# Patient Record
Sex: MALE | Race: WHITE | NOT HISPANIC OR LATINO | Employment: FULL TIME | URBAN - METROPOLITAN AREA
[De-identification: names, ages, dates, MRNs, and addresses within clinical notes are randomized per-mention and may not be internally consistent; named-entity substitution may affect disease eponyms.]

---

## 2020-12-09 ENCOUNTER — LAB VISIT (OUTPATIENT)
Dept: LAB | Facility: HOSPITAL | Age: 50
End: 2020-12-09
Attending: FAMILY MEDICINE
Payer: COMMERCIAL

## 2020-12-09 ENCOUNTER — OFFICE VISIT (OUTPATIENT)
Dept: INTERNAL MEDICINE | Facility: CLINIC | Age: 50
End: 2020-12-09
Payer: COMMERCIAL

## 2020-12-09 VITALS
DIASTOLIC BLOOD PRESSURE: 86 MMHG | OXYGEN SATURATION: 95 % | HEIGHT: 70 IN | HEART RATE: 78 BPM | SYSTOLIC BLOOD PRESSURE: 122 MMHG | WEIGHT: 268.75 LBS | BODY MASS INDEX: 38.47 KG/M2 | TEMPERATURE: 97 F

## 2020-12-09 DIAGNOSIS — Z00.00 ANNUAL PHYSICAL EXAM: ICD-10-CM

## 2020-12-09 DIAGNOSIS — Z82.41 FAMILY HISTORY OF SUDDEN CARDIAC DEATH: ICD-10-CM

## 2020-12-09 DIAGNOSIS — Z12.5 SCREENING FOR PROSTATE CANCER: ICD-10-CM

## 2020-12-09 DIAGNOSIS — Z11.59 NEED FOR HEPATITIS C SCREENING TEST: ICD-10-CM

## 2020-12-09 DIAGNOSIS — Z11.4 SCREENING FOR HIV (HUMAN IMMUNODEFICIENCY VIRUS): ICD-10-CM

## 2020-12-09 DIAGNOSIS — Z00.00 ANNUAL PHYSICAL EXAM: Primary | ICD-10-CM

## 2020-12-09 DIAGNOSIS — E11.69 HYPERLIPIDEMIA ASSOCIATED WITH TYPE 2 DIABETES MELLITUS: ICD-10-CM

## 2020-12-09 DIAGNOSIS — Z12.11 SCREENING FOR COLON CANCER: ICD-10-CM

## 2020-12-09 DIAGNOSIS — M1A.09X0 IDIOPATHIC CHRONIC GOUT OF MULTIPLE SITES WITHOUT TOPHUS: ICD-10-CM

## 2020-12-09 DIAGNOSIS — E78.5 HYPERLIPIDEMIA ASSOCIATED WITH TYPE 2 DIABETES MELLITUS: ICD-10-CM

## 2020-12-09 DIAGNOSIS — E66.01 SEVERE OBESITY (BMI 35.0-39.9) WITH COMORBIDITY: ICD-10-CM

## 2020-12-09 DIAGNOSIS — E11.9 TYPE 2 DIABETES MELLITUS WITHOUT COMPLICATION, WITHOUT LONG-TERM CURRENT USE OF INSULIN: ICD-10-CM

## 2020-12-09 DIAGNOSIS — B00.1 RECURRENT COLD SORES: ICD-10-CM

## 2020-12-09 LAB
ALBUMIN SERPL BCP-MCNC: 4.3 G/DL (ref 3.5–5.2)
ALP SERPL-CCNC: 65 U/L (ref 55–135)
ALT SERPL W/O P-5'-P-CCNC: 28 U/L (ref 10–44)
ANION GAP SERPL CALC-SCNC: 11 MMOL/L (ref 8–16)
AST SERPL-CCNC: 26 U/L (ref 10–40)
BILIRUB SERPL-MCNC: 0.8 MG/DL (ref 0.1–1)
BUN SERPL-MCNC: 16 MG/DL (ref 6–20)
CALCIUM SERPL-MCNC: 9.6 MG/DL (ref 8.7–10.5)
CHLORIDE SERPL-SCNC: 101 MMOL/L (ref 95–110)
CHOLEST SERPL-MCNC: 149 MG/DL (ref 120–199)
CHOLEST/HDLC SERPL: 4.5 {RATIO} (ref 2–5)
CO2 SERPL-SCNC: 25 MMOL/L (ref 23–29)
COMPLEXED PSA SERPL-MCNC: 0.78 NG/ML (ref 0–4)
CREAT SERPL-MCNC: 1.1 MG/DL (ref 0.5–1.4)
ERYTHROCYTE [DISTWIDTH] IN BLOOD BY AUTOMATED COUNT: 12.3 % (ref 11.5–14.5)
EST. GFR  (AFRICAN AMERICAN): >60 ML/MIN/1.73 M^2
EST. GFR  (NON AFRICAN AMERICAN): >60 ML/MIN/1.73 M^2
ESTIMATED AVG GLUCOSE: 169 MG/DL (ref 68–131)
GLUCOSE SERPL-MCNC: 151 MG/DL (ref 70–110)
HBA1C MFR BLD HPLC: 7.5 % (ref 4–5.6)
HCT VFR BLD AUTO: 46.6 % (ref 40–54)
HDLC SERPL-MCNC: 33 MG/DL (ref 40–75)
HDLC SERPL: 22.1 % (ref 20–50)
HGB BLD-MCNC: 15.6 G/DL (ref 14–18)
LDLC SERPL CALC-MCNC: 43 MG/DL (ref 63–159)
MCH RBC QN AUTO: 30.2 PG (ref 27–31)
MCHC RBC AUTO-ENTMCNC: 33.5 G/DL (ref 32–36)
MCV RBC AUTO: 90 FL (ref 82–98)
NONHDLC SERPL-MCNC: 116 MG/DL
PLATELET # BLD AUTO: 217 K/UL (ref 150–350)
PMV BLD AUTO: 11 FL (ref 9.2–12.9)
POTASSIUM SERPL-SCNC: 4.4 MMOL/L (ref 3.5–5.1)
PROT SERPL-MCNC: 7.8 G/DL (ref 6–8.4)
RBC # BLD AUTO: 5.17 M/UL (ref 4.6–6.2)
SODIUM SERPL-SCNC: 137 MMOL/L (ref 136–145)
TRIGL SERPL-MCNC: 365 MG/DL (ref 30–150)
TSH SERPL DL<=0.005 MIU/L-ACNC: 2.21 UIU/ML (ref 0.4–4)
URATE SERPL-MCNC: 7.5 MG/DL (ref 3.4–7)
WBC # BLD AUTO: 8.45 K/UL (ref 3.9–12.7)

## 2020-12-09 PROCEDURE — 86803 HEPATITIS C AB TEST: CPT

## 2020-12-09 PROCEDURE — 84550 ASSAY OF BLOOD/URIC ACID: CPT

## 2020-12-09 PROCEDURE — 99999 PR PBB SHADOW E&M-NEW PATIENT-LVL III: ICD-10-PCS | Mod: PBBFAC,,, | Performed by: FAMILY MEDICINE

## 2020-12-09 PROCEDURE — 80061 LIPID PANEL: CPT

## 2020-12-09 PROCEDURE — 83036 HEMOGLOBIN GLYCOSYLATED A1C: CPT

## 2020-12-09 PROCEDURE — 36415 COLL VENOUS BLD VENIPUNCTURE: CPT

## 2020-12-09 PROCEDURE — 86703 HIV-1/HIV-2 1 RESULT ANTBDY: CPT

## 2020-12-09 PROCEDURE — 99999 PR PBB SHADOW E&M-NEW PATIENT-LVL III: CPT | Mod: PBBFAC,,, | Performed by: FAMILY MEDICINE

## 2020-12-09 PROCEDURE — 80053 COMPREHEN METABOLIC PANEL: CPT

## 2020-12-09 PROCEDURE — 99386 PR PREVENTIVE VISIT,NEW,40-64: ICD-10-PCS | Mod: S$GLB,,, | Performed by: FAMILY MEDICINE

## 2020-12-09 PROCEDURE — 84443 ASSAY THYROID STIM HORMONE: CPT

## 2020-12-09 PROCEDURE — 85027 COMPLETE CBC AUTOMATED: CPT

## 2020-12-09 PROCEDURE — 84153 ASSAY OF PSA TOTAL: CPT

## 2020-12-09 PROCEDURE — 99386 PREV VISIT NEW AGE 40-64: CPT | Mod: S$GLB,,, | Performed by: FAMILY MEDICINE

## 2020-12-09 RX ORDER — VALACYCLOVIR HYDROCHLORIDE 1 G/1
1000 TABLET, FILM COATED ORAL DAILY
COMMUNITY
End: 2021-12-28 | Stop reason: SDUPTHER

## 2020-12-09 RX ORDER — VALACYCLOVIR HYDROCHLORIDE 500 MG/1
500 TABLET, FILM COATED ORAL
COMMUNITY
End: 2020-12-09

## 2020-12-09 RX ORDER — GLIMEPIRIDE 2 MG/1
2 TABLET ORAL
COMMUNITY
End: 2021-12-07 | Stop reason: SDUPTHER

## 2020-12-09 RX ORDER — COLCHICINE 0.6 MG/1
0.6 TABLET ORAL
COMMUNITY
End: 2021-10-14

## 2020-12-09 RX ORDER — METFORMIN HYDROCHLORIDE 500 MG/1
500 TABLET, EXTENDED RELEASE ORAL 2 TIMES DAILY WITH MEALS
COMMUNITY
End: 2021-12-07 | Stop reason: SDUPTHER

## 2020-12-09 RX ORDER — ALLOPURINOL 300 MG/1
300 TABLET ORAL DAILY
COMMUNITY
End: 2021-10-14 | Stop reason: SDUPTHER

## 2020-12-09 RX ORDER — TADALAFIL 20 MG/1
10-20 TABLET ORAL DAILY PRN
COMMUNITY
Start: 2020-09-15 | End: 2021-04-19 | Stop reason: SDUPTHER

## 2020-12-09 RX ORDER — LOVASTATIN 40 MG/1
40 TABLET ORAL DAILY
COMMUNITY
End: 2021-04-19 | Stop reason: SDUPTHER

## 2020-12-09 RX ORDER — ASPIRIN 81 MG/1
81 TABLET ORAL DAILY
Refills: 0
Start: 2020-12-09 | End: 2022-05-10

## 2020-12-09 NOTE — PROGRESS NOTES
Subjective:   Patient ID:  Andrade Barahona is a 50 y.o. male.    Chief Complaint:  Annual Exam and Establish Care    Past Medical History:   Diagnosis Date    Diabetes mellitus, type 2     Hyperlipidemia associated with type 2 diabetes mellitus 12/9/2020    Idiopathic chronic gout of multiple sites without tophus 12/9/2020    Recurrent cold sores 12/9/2020    Severe obesity (BMI 35.0-39.9) with comorbidity 12/9/2020     Past Surgical History:   Procedure Laterality Date    APPENDECTOMY      KNEE SURGERY       Family History   Problem Relation Age of Onset    Heart disease Father      Review of patient's allergies indicates:  No Known Allergies    Current Outpatient Medications:     allopurinoL (ZYLOPRIM) 300 MG tablet, Take 300 mg by mouth once daily., Disp: , Rfl:     colchicine (COLCRYS) 0.6 mg tablet, Take 0.6 mg by mouth as needed., Disp: , Rfl:     glimepiride (AMARYL) 2 MG tablet, Take 2 mg by mouth before breakfast., Disp: , Rfl:     lovastatin (MEVACOR) 40 MG tablet, Take 40 mg by mouth once daily., Disp: , Rfl:     metFORMIN (GLUCOPHAGE-XR) 500 MG ER 24hr tablet, Take 500 mg by mouth 2 (two) times daily with meals., Disp: , Rfl:     tadalafiL (CIALIS) 20 MG Tab, Take 10-20 mg by mouth daily as needed., Disp: , Rfl:     valACYclovir (VALTREX) 1000 MG tablet, Take 1,000 mg by mouth once daily., Disp: , Rfl:     aspirin (ECOTRIN) 81 MG EC tablet, Take 1 tablet (81 mg total) by mouth once daily., Disp: , Rfl: 0     Patient presents annual physical exam/ establish care  Last physical March 2019 by PCP in [place].      Medical history for:   - Diabetes mellitus.  Reports last A1c less than 8%.  Metformin  twice a day and   Amaryl 2 mg daily.  Reports compliance.  Denies side effects.  No symptoms hypo or hyperglycemia. Due for A1c, micro albumin, eye exam, and foot exam  - Hyperlipidemia.  On Mevacor 40 mg daily.  Reports compliance.  Denies side effects.  No chest pain or claudication.   No recent lipid panel on file.    - Gout.  On allopurinol 300 mg daily.  No exacerbations /ataxia greater than 2 years on medication.  Uses Colcrys if needed.    - Fever blister/cold sores.  Valtrex  500 mg twice a day as needed for outbreak.  - Erectile dysfunction.  Cialis as needed.    Family history for:   Sudden cardiac death in 2 family members father and grandfather.  Unclear etiology.  Questionable heart valve/ structural aided versus coronary artery disease.  Both were asymptomatic.    No colon or prostate cancer    Health maintenance needs include:  Colon cancer screening  Tetanus, shingles, pneumonia, and flu vaccines  HIV and hep C screening    No specific complaints or concerns today.          Review of Systems   Constitutional: Negative for chills, diaphoresis, fatigue and fever.   HENT: Negative for congestion, dental problem, ear discharge, ear pain, postnasal drip, rhinorrhea, sinus pressure, sinus pain, sore throat and trouble swallowing.    Eyes: Negative for pain, redness and visual disturbance.   Respiratory: Negative for cough, chest tightness, shortness of breath and wheezing.    Cardiovascular: Negative for chest pain, palpitations and leg swelling.   Gastrointestinal: Negative for abdominal pain, constipation, diarrhea, nausea and vomiting.   Endocrine: Negative for cold intolerance, heat intolerance, polydipsia, polyphagia and polyuria.   Genitourinary: Negative for difficulty urinating, dysuria, flank pain, frequency, hematuria and urgency.   Musculoskeletal: Negative for myalgias and neck pain.   Skin: Negative for rash.   Neurological: Negative for dizziness, tremors, syncope, weakness, light-headedness, numbness and headaches.   Hematological: Negative for adenopathy. Does not bruise/bleed easily.   Psychiatric/Behavioral: Negative for sleep disturbance. The patient is not nervous/anxious.        Objective:   /86 (BP Location: Right arm, Patient Position: Sitting, BP Method: Large  "(Manual))   Pulse 78   Temp 96.5 °F (35.8 °C) (Tympanic)   Ht 5' 10" (1.778 m)   Wt 121.9 kg (268 lb 11.9 oz)   SpO2 95%   BMI 38.56 kg/m²     Physical Exam  Vitals signs and nursing note reviewed.   Constitutional:       General: He is not in acute distress.     Appearance: Normal appearance. He is well-developed. He is obese.   HENT:      Right Ear: Hearing, tympanic membrane, ear canal and external ear normal.      Left Ear: Hearing, tympanic membrane, ear canal and external ear normal.      Nose: Nose normal. No mucosal edema, congestion or rhinorrhea.      Right Turbinates: Not enlarged or swollen.      Left Turbinates: Not enlarged or swollen.      Right Sinus: No maxillary sinus tenderness or frontal sinus tenderness.      Left Sinus: No maxillary sinus tenderness or frontal sinus tenderness.      Mouth/Throat:      Mouth: No oral lesions.      Pharynx: Oropharynx is clear. Uvula midline.      Tonsils: No tonsillar exudate.   Eyes:      General: No scleral icterus.        Right eye: No discharge.         Left eye: No discharge.      Conjunctiva/sclera: Conjunctivae normal.      Right eye: Right conjunctiva is not injected.      Left eye: Left conjunctiva is not injected.   Neck:      Thyroid: No thyroid mass, thyromegaly or thyroid tenderness.      Vascular: No carotid bruit or JVD.   Cardiovascular:      Rate and Rhythm: Normal rate and regular rhythm.      Pulses:           Radial pulses are 2+ on the right side and 2+ on the left side.        Dorsalis pedis pulses are 2+ on the right side and 2+ on the left side.        Posterior tibial pulses are 2+ on the right side and 2+ on the left side.      Heart sounds: Normal heart sounds. No murmur. No friction rub. No gallop.    Pulmonary:      Effort: Pulmonary effort is normal. No accessory muscle usage or respiratory distress.      Breath sounds: Normal breath sounds. No wheezing, rhonchi or rales.   Abdominal:      General: There is no distension.      " Palpations: Abdomen is soft.      Tenderness: There is no abdominal tenderness. There is no guarding or rebound.   Musculoskeletal:      Right lower leg: No edema.      Left lower leg: No edema.   Feet:      Right foot:      Protective Sensation: 5 sites tested. 1 site sensed.     Skin integrity: Dry skin present. No ulcer, blister, skin breakdown, erythema, warmth, callus or fissure.      Toenail Condition: Right toenails are normal.      Left foot:      Protective Sensation: 5 sites tested. 4 sites sensed.      Skin integrity: Dry skin present. No ulcer, blister, skin breakdown, erythema, warmth, callus or fissure.      Toenail Condition: Left toenails are normal.   Lymphadenopathy:      Cervical: No cervical adenopathy.   Skin:     General: Skin is warm and dry.      Findings: No rash.   Neurological:      Mental Status: He is alert and oriented to person, place, and time.      Coordination: Coordination is intact. Coordination normal.      Gait: Gait is intact. Gait normal.   Psychiatric:         Attention and Perception: Attention and perception normal.         Mood and Affect: Mood and affect normal.         Speech: Speech normal.         Behavior: Behavior normal.         Thought Content: Thought content normal.         Cognition and Memory: Cognition and memory normal.         Judgment: Judgment normal.       Assessment:     1. Annual physical exam    2. Screening for HIV (human immunodeficiency virus)    3. Need for hepatitis C screening test    4. Screening for colon cancer    5. Type 2 diabetes mellitus without complication, without long-term current use of insulin    6. Hyperlipidemia associated with type 2 diabetes mellitus    7. Idiopathic chronic gout of multiple sites without tophus    8. Recurrent cold sores    9. Severe obesity (BMI 35.0-39.9) with comorbidity    10. Screening for prostate cancer    11. Family history of sudden cardiac death      Plan:   Annual physical exam  Screening for HIV  (human immunodeficiency virus)  Need for hepatitis C screening test  Screening for colon cancer  -     PSA, Screening; Future; Expected date: 12/09/2020  -     CBC Without Differential; Future; Expected date: 12/09/2020  -     Comprehensive Metabolic Panel; Future; Expected date: 12/09/2020  -     Lipid Panel; Future; Expected date: 12/09/2020  -     PSA, Screening; Future; Expected date: 12/09/2020  -     TSH; Future; Expected date: 12/09/2020  -     Hemoglobin A1C; Future; Expected date: 12/09/2020  -     Microalbumin/Creatinine Ratio, Urine; Future; Expected date: 12/09/2020  -     Case Request Endoscopy: COLONOSCOPY  -     Uric Acid; Future; Expected date: 12/09/2020  -     HIV 1/2 Ag/Ab (4th Gen); Future; Expected date: 12/09/2020  -     Hepatitis C Antibody; Future; Expected date: 12/09/2020  Blood pressure normal.  BMI 38.5.  Remainder exam stable.    Check labs.  Treat as indicated.    Reported tetanus booster up-to-date  Considering shingles vaccine through pharmacy   Declines pneumonia and flu vaccine  Agrees to colonoscopy    Type 2 diabetes mellitus without complication, without long-term current use of insulin  -     Hemoglobin A1C; Future; Expected date: 12/09/2020  -     Microalbumin/Creatinine Ratio, Urine; Future; Expected date: 12/09/2020  -     Ambulatory referral/consult to Ophthalmology; Future; Expected date: 12/16/2020  Continue metformin  mg twice a day  Consider change of Amaryl to Ozempic based on A1c results   If micro albumin positive, will need to start Ace/Arb   Referred for eye exam   Foot exam stable     Hyperlipidemia associated with type 2 diabetes mellitus  -     Lipid Panel; Future; Expected date: 12/09/2020  -     aspirin (ECOTRIN) 81 MG EC tablet; Take 1 tablet (81 mg total) by mouth once daily.; Refill: 0  Start aspirin 81 mg daily.    Adjust medical 40 mg daily if needed   Goal LDL less than 100 diabetic     Idiopathic chronic gout of multiple sites without  tophus  Stable.  Asymptomatic.  Controlled.    Continue allopurinol 300 mg daily.      Recurrent cold sores  Take Two pills initially and repeat Two pills in 12 hours at outbreak of symptoms/lesions.  Four pills total per outbreak  If need to treat more than 7 outbreaks, in a 12 month period, please return to clinic for additional evaluation.    Severe obesity (BMI 35.0-39.9) with comorbidity  Discussed increased BMI, Increased health risks, Need for weight loss, Lifestyle modifications.  Discussed based on BMI candidate for medications or surgery for weight loss if interested.  Advised to research what may be covered by insurance.    Family history of sudden cardiac death  -     Ambulatory referral/consult to Cardiology; Future; Expected date: 12/16/2020    Return to clinic 6 months or sooner as needed.

## 2020-12-10 LAB
HCV AB SERPL QL IA: NEGATIVE
HIV 1+2 AB+HIV1 P24 AG SERPL QL IA: NEGATIVE

## 2020-12-11 NOTE — PROGRESS NOTES
Subjective:   Patient ID:  Andrade Barahona is a 50 y.o. male who presents for evaluation of No chief complaint on file.   With a long talk today about his overall risk for coronary disease.  He has evidence of diabetes.  No hypertension.  The patient has elevated lipid levels and has been on statin medications but has continued high triglyceride levels.  There is significant family history of coronary disease with father having MI and dying at age 53.  Also his paternal grandfather also  early with coronary artery disease.  Patient originally worked in [place] and now here in [place].  His overall risk for coronary artery disease in the next 10 years is about 7%.  I would like to do a stress echo as he does have some intermittent shortness of breath.  We also talked about lowering his lipid levels with a better diet and also adding Zetia.  Pleasant patient with a family history of coronary disease and sudden death.  The patient is quite concerned about the sudden death in his family and risk assessment for CAD.  History of obesity, and diabetes.  Patient is a nonsmoker.    Family History   Problem Relation Age of Onset    Heart disease Father      Past Medical History:   Diagnosis Date    Diabetes mellitus, type 2     Hyperlipidemia associated with type 2 diabetes mellitus 2020    Idiopathic chronic gout of multiple sites without tophus 2020    Recurrent cold sores 2020    Severe obesity (BMI 35.0-39.9) with comorbidity 2020     Social History     Socioeconomic History    Marital status:      Spouse name: Not on file    Number of children: Not on file    Years of education: Not on file    Highest education level: Not on file   Occupational History    Not on file   Social Needs    Financial resource strain: Not on file    Food insecurity     Worry: Not on file     Inability: Not on file    Transportation needs     Medical: Not on file     Non-medical: Not on file    Tobacco Use    Smoking status: Never Smoker   Substance and Sexual Activity    Alcohol use: Yes    Drug use: Not on file    Sexual activity: Yes     Partners: Female   Lifestyle    Physical activity     Days per week: Not on file     Minutes per session: Not on file    Stress: Not on file   Relationships    Social connections     Talks on phone: Not on file     Gets together: Not on file     Attends Congregation service: Not on file     Active member of club or organization: Not on file     Attends meetings of clubs or organizations: Not on file     Relationship status: Not on file   Other Topics Concern    Not on file   Social History Narrative    Not on file     Current Outpatient Medications on File Prior to Visit   Medication Sig Dispense Refill    allopurinoL (ZYLOPRIM) 300 MG tablet Take 300 mg by mouth once daily.      aspirin (ECOTRIN) 81 MG EC tablet Take 1 tablet (81 mg total) by mouth once daily.  0    colchicine (COLCRYS) 0.6 mg tablet Take 0.6 mg by mouth as needed.      glimepiride (AMARYL) 2 MG tablet Take 2 mg by mouth before breakfast.      lovastatin (MEVACOR) 40 MG tablet Take 40 mg by mouth once daily.      metFORMIN (GLUCOPHAGE-XR) 500 MG ER 24hr tablet Take 500 mg by mouth 2 (two) times daily with meals.      tadalafiL (CIALIS) 20 MG Tab Take 10-20 mg by mouth daily as needed.      valACYclovir (VALTREX) 1000 MG tablet Take 1,000 mg by mouth once daily.       No current facility-administered medications on file prior to visit.      Review of patient's allergies indicates:  No Known Allergies    Review of Systems   Constitution: Negative for chills, diaphoresis, night sweats, weight gain and weight loss.   HENT: Negative for congestion, hoarse voice, sore throat and stridor.    Eyes: Negative for double vision and pain.   Cardiovascular: Negative for chest pain, claudication, cyanosis, dyspnea on exertion, irregular heartbeat, leg swelling, near-syncope, orthopnea, palpitations,  paroxysmal nocturnal dyspnea and syncope.   Respiratory: Negative for cough, hemoptysis, shortness of breath, sleep disturbances due to breathing, snoring, sputum production and wheezing.    Endocrine: Negative for cold intolerance, heat intolerance and polydipsia.   Hematologic/Lymphatic: Negative for bleeding problem. Does not bruise/bleed easily.   Skin: Negative for color change, dry skin and rash.   Musculoskeletal: Negative for joint swelling and muscle cramps.   Gastrointestinal: Negative for bloating, abdominal pain, constipation, diarrhea, dysphagia, melena, nausea and vomiting.   Genitourinary: Negative for flank pain and urgency.   Neurological: Negative for dizziness, focal weakness, headaches, light-headedness, loss of balance, seizures and weakness.   Psychiatric/Behavioral: Negative for altered mental status and memory loss. The patient is not nervous/anxious.          Objective:     Physical Exam   Constitutional: He is oriented to person, place, and time.   Eyes: Pupils are equal, round, and reactive to light.   Neck: Normal range of motion. No tracheal deviation present.   Cardiovascular: Normal rate, regular rhythm, normal heart sounds and intact distal pulses. Exam reveals no gallop and no friction rub.   No murmur heard.  Pulses:       Carotid pulses are 2+ on the right side and 2+ on the left side.       Radial pulses are 2+ on the right side and 2+ on the left side.        Femoral pulses are 2+ on the right side and 2+ on the left side.       Popliteal pulses are 2+ on the right side and 2+ on the left side.        Dorsalis pedis pulses are 2+ on the right side and 2+ on the left side.        Posterior tibial pulses are 2+ on the right side and 2+ on the left side.   Pulmonary/Chest: Effort normal and breath sounds normal. No stridor. No respiratory distress. He has no wheezes. He has no rales. He exhibits no tenderness.   Abdominal: He exhibits no distension. There is no abdominal tenderness.  There is no rebound.   Musculoskeletal:         General: No tenderness or edema.   Neurological: He is alert and oriented to person, place, and time.   Skin: Skin is warm and dry.     Cholesterol 120 - 199 mg/dL 149    Comment: The National Cholesterol Education Program (NCEP) has set the   following guidelines (reference ranges) for Cholesterol:   Optimal.....................<200 mg/dL   Borderline High.............200-239 mg/dL   High........................> or = 240 mg/dL    Triglycerides 30 - 150 mg/dL 365High     Comment: The National Cholesterol Education Program (NCEP) has set the   following guidelines (reference values) for triglycerides:   Normal......................<150 mg/dL   Borderline High.............150-199 mg/dL   High........................200-499 mg/dL    HDL 40 - 75 mg/dL 33Low     Comment: The National Cholesterol Education Program (NCEP) has set the   following guidelines (reference values) for HDL Cholesterol:   Low...............<40 mg/dL   Optimal...........>60 mg/dL    LDL Cholesterol 63.0 - 159.0 mg/dL 43.0Low     Comment: The National Cholesterol Education Program (NCEP) has set the   following guidelines (reference values) for LDL Cholesterol:   Optimal.......................<130 mg/dL   Borderline High...............130-159 mg/dL   High..........................160-189 mg/dL   Very High.....................>190 mg/dL    HDL/Cholesterol Ratio 20.0 - 50.0 % 22.1               Assessment:     1. Family history of sudden cardiac death    2. Idiopathic chronic gout of multiple sites without tophus    3. Type 2 diabetes mellitus without complication, without long-term current use of insulin    4. Severe obesity (BMI 35.0-39.9) with comorbidity    5. Hyperlipidemia associated with type 2 diabetes mellitus          Plan:     1.  Family history of sudden death:  Most likely this is MI related and will do a stress echo to rule out cardiac ischemia in this patient who already has elevated  lipid levels and treated.  2.  Diabetes:  Continued adjustment in medications to lower his overall hemoglobin A1c.  3.  Hyperlipidemia:  We discussed the fact that this is related to his diabetes.  And needs to lower his overall a hemoglobin A1c and blood sugars.  Will add Zetia 10 mg daily to adjust and hopefully improve his triglyceride levels which are greater than 300.  Will have a follow-up cholesterol profile in the next month.  I will see the patient back after the stress test is done in about 4-5 weeks.  All questions were answered.  I will see the patient sooner if he has additional questions or concerns.

## 2020-12-15 DIAGNOSIS — E78.5 HYPERLIPIDEMIA ASSOCIATED WITH TYPE 2 DIABETES MELLITUS: Primary | Chronic | ICD-10-CM

## 2020-12-15 DIAGNOSIS — E11.69 HYPERLIPIDEMIA ASSOCIATED WITH TYPE 2 DIABETES MELLITUS: Primary | Chronic | ICD-10-CM

## 2020-12-16 ENCOUNTER — OFFICE VISIT (OUTPATIENT)
Dept: CARDIOLOGY | Facility: CLINIC | Age: 50
End: 2020-12-16
Payer: COMMERCIAL

## 2020-12-16 ENCOUNTER — HOSPITAL ENCOUNTER (OUTPATIENT)
Dept: CARDIOLOGY | Facility: HOSPITAL | Age: 50
Discharge: HOME | End: 2020-12-16
Attending: INTERNAL MEDICINE

## 2020-12-16 VITALS
HEIGHT: 70 IN | WEIGHT: 258 LBS | DIASTOLIC BLOOD PRESSURE: 84 MMHG | SYSTOLIC BLOOD PRESSURE: 110 MMHG | BODY MASS INDEX: 36.94 KG/M2 | RESPIRATION RATE: 16 BRPM | OXYGEN SATURATION: 98 % | HEART RATE: 63 BPM

## 2020-12-16 DIAGNOSIS — M1A.09X0 IDIOPATHIC CHRONIC GOUT OF MULTIPLE SITES WITHOUT TOPHUS: Chronic | ICD-10-CM

## 2020-12-16 DIAGNOSIS — E78.5 HYPERLIPIDEMIA ASSOCIATED WITH TYPE 2 DIABETES MELLITUS: Chronic | ICD-10-CM

## 2020-12-16 DIAGNOSIS — E11.69 HYPERLIPIDEMIA ASSOCIATED WITH TYPE 2 DIABETES MELLITUS: Chronic | ICD-10-CM

## 2020-12-16 DIAGNOSIS — Z82.41 FAMILY HISTORY OF SUDDEN CARDIAC DEATH: Primary | ICD-10-CM

## 2020-12-16 DIAGNOSIS — E66.01 SEVERE OBESITY (BMI 35.0-39.9) WITH COMORBIDITY: Chronic | ICD-10-CM

## 2020-12-16 DIAGNOSIS — E11.9 TYPE 2 DIABETES MELLITUS WITHOUT COMPLICATION, WITHOUT LONG-TERM CURRENT USE OF INSULIN: Chronic | ICD-10-CM

## 2020-12-16 PROCEDURE — 93010 EKG 12-LEAD: ICD-10-PCS | Mod: ,,, | Performed by: INTERNAL MEDICINE

## 2020-12-16 PROCEDURE — 99999 PR PBB SHADOW E&M-EST. PATIENT-LVL IV: ICD-10-PCS | Mod: PBBFAC,,, | Performed by: INTERNAL MEDICINE

## 2020-12-16 PROCEDURE — 93010 ELECTROCARDIOGRAM REPORT: CPT | Mod: ,,, | Performed by: INTERNAL MEDICINE

## 2020-12-16 PROCEDURE — 99204 PR OFFICE/OUTPT VISIT, NEW, LEVL IV, 45-59 MIN: ICD-10-PCS | Mod: S$GLB,,, | Performed by: INTERNAL MEDICINE

## 2020-12-16 PROCEDURE — 93005 ELECTROCARDIOGRAM TRACING: CPT

## 2020-12-16 PROCEDURE — 99204 OFFICE O/P NEW MOD 45 MIN: CPT | Mod: S$GLB,,, | Performed by: INTERNAL MEDICINE

## 2020-12-16 PROCEDURE — 99999 PR PBB SHADOW E&M-EST. PATIENT-LVL IV: CPT | Mod: PBBFAC,,, | Performed by: INTERNAL MEDICINE

## 2020-12-16 RX ORDER — EZETIMIBE 10 MG/1
10 TABLET ORAL DAILY
Qty: 90 TABLET | Refills: 3 | Status: SHIPPED | OUTPATIENT
Start: 2020-12-16 | End: 2021-12-07 | Stop reason: SDUPTHER

## 2021-01-07 ENCOUNTER — HOSPITAL ENCOUNTER (OUTPATIENT)
Dept: CARDIOLOGY | Facility: HOSPITAL | Age: 51
Discharge: HOME | End: 2021-01-07
Attending: INTERNAL MEDICINE
Payer: COMMERCIAL

## 2021-01-07 VITALS — WEIGHT: 258 LBS | HEIGHT: 70 IN | BODY MASS INDEX: 36.94 KG/M2

## 2021-01-07 DIAGNOSIS — E66.01 SEVERE OBESITY (BMI 35.0-39.9) WITH COMORBIDITY: ICD-10-CM

## 2021-01-07 DIAGNOSIS — M1A.09X0 IDIOPATHIC CHRONIC GOUT OF MULTIPLE SITES WITHOUT TOPHUS: ICD-10-CM

## 2021-01-07 DIAGNOSIS — E11.69 HYPERLIPIDEMIA ASSOCIATED WITH TYPE 2 DIABETES MELLITUS: ICD-10-CM

## 2021-01-07 DIAGNOSIS — E78.5 HYPERLIPIDEMIA ASSOCIATED WITH TYPE 2 DIABETES MELLITUS: ICD-10-CM

## 2021-01-07 DIAGNOSIS — Z82.41 FAMILY HISTORY OF SUDDEN CARDIAC DEATH: ICD-10-CM

## 2021-01-07 DIAGNOSIS — E11.9 TYPE 2 DIABETES MELLITUS WITHOUT COMPLICATION, WITHOUT LONG-TERM CURRENT USE OF INSULIN: ICD-10-CM

## 2021-01-07 LAB
AORTIC ROOT ANNULUS: 4.04 CM
ASCENDING AORTA: 3.34 CM
AV INDEX (PROSTH): 0.78
AV MEAN GRADIENT: 4 MMHG
AV PEAK GRADIENT: 6 MMHG
AV VALVE AREA: 2.79 CM2
AV VELOCITY RATIO: 0.79
BSA FOR ECHO PROCEDURE: 2.4 M2
CV ECHO LV RWT: 0.59 CM
CV STRESS BASE HR: 74 BPM
DIASTOLIC BLOOD PRESSURE: 68 MMHG
DOP CALC AO PEAK VEL: 1.25 M/S
DOP CALC AO VTI: 28.68 CM
DOP CALC LVOT AREA: 3.6 CM2
DOP CALC LVOT DIAMETER: 2.14 CM
DOP CALC LVOT PEAK VEL: 0.99 M/S
DOP CALC LVOT STROKE VOLUME: 80.13 CM3
DOP CALC RVOT PEAK VEL: 0.58 M/S
DOP CALC RVOT VTI: 12.78 CM
DOP CALCLVOT PEAK VEL VTI: 22.29 CM
E WAVE DECELERATION TIME: 209.87 MSEC
E/A RATIO: 1.14
E/E' RATIO: 9 M/S
ECHO LV POSTERIOR WALL: 1.24 CM (ref 0.6–1.1)
FRACTIONAL SHORTENING: 35 % (ref 28–44)
INTERVENTRICULAR SEPTUM: 1.11 CM (ref 0.6–1.1)
IVRT: 68.51 MSEC
LA MAJOR: 5.48 CM
LA MINOR: 5.39 CM
LA WIDTH: 3.31 CM
LEFT ATRIUM SIZE: 3.67 CM
LEFT ATRIUM VOLUME INDEX: 24.1 ML/M2
LEFT ATRIUM VOLUME: 56.12 CM3
LEFT INTERNAL DIMENSION IN SYSTOLE: 2.75 CM (ref 2.1–4)
LEFT VENTRICLE DIASTOLIC VOLUME INDEX: 33.93 ML/M2
LEFT VENTRICLE DIASTOLIC VOLUME: 78.91 ML
LEFT VENTRICLE MASS INDEX: 75 G/M2
LEFT VENTRICLE SYSTOLIC VOLUME INDEX: 12.1 ML/M2
LEFT VENTRICLE SYSTOLIC VOLUME: 28.19 ML
LEFT VENTRICULAR INTERNAL DIMENSION IN DIASTOLE: 4.21 CM (ref 3.5–6)
LEFT VENTRICULAR MASS: 173.39 G
LV LATERAL E/E' RATIO: 7.62 M/S
LV SEPTAL E/E' RATIO: 11 M/S
MV A" WAVE DURATION": 11.99 MSEC
MV PEAK A VEL: 0.87 M/S
MV PEAK E VEL: 0.99 M/S
OHS CV CPX 1 MINUTE RECOVERY HEART RATE: 120 BPM
OHS CV CPX 85 PERCENT MAX PREDICTED HEART RATE MALE: 145
OHS CV CPX ESTIMATED METS: 12
OHS CV CPX MAX PREDICTED HEART RATE: 170
OHS CV CPX PATIENT IS FEMALE: 0
OHS CV CPX PATIENT IS MALE: 1
OHS CV CPX PEAK DIASTOLIC BLOOD PRESSURE: 61 MMHG
OHS CV CPX PEAK HEAR RATE: 157 BPM
OHS CV CPX PEAK RATE PRESSURE PRODUCT: ABNORMAL
OHS CV CPX PEAK SYSTOLIC BLOOD PRESSURE: 208 MMHG
OHS CV CPX PERCENT MAX PREDICTED HEART RATE ACHIEVED: 92
OHS CV CPX RATE PRESSURE PRODUCT PRESENTING: 7992
PISA TR MAX VEL: 1.76 M/S
PULM VEIN S/D RATIO: 1.5
PV MEAN GRADIENT: 1 MMHG
PV PEAK D VEL: 0.38 M/S
PV PEAK S VEL: 0.57 M/S
PV PEAK VELOCITY: 1.01 CM/S
RA MAJOR: 5.03 CM
RA PRESSURE: 3 MMHG
RA WIDTH: 3.28 CM
RIGHT VENTRICULAR END-DIASTOLIC DIMENSION: 2.74 CM
SINUS: 3.54 CM
STJ: 3.27 CM
STRESS ECHO POST EXERCISE DUR MIN: 10 MINUTES
STRESS ECHO POST EXERCISE DUR SEC: 49 SECONDS
SYSTOLIC BLOOD PRESSURE: 108 MMHG
TDI LATERAL: 0.13 M/S
TDI SEPTAL: 0.09 M/S
TDI: 0.11 M/S
TR MAX PG: 12 MMHG
TRICUSPID ANNULAR PLANE SYSTOLIC EXCURSION: 2.46 CM
TV REST PULMONARY ARTERY PRESSURE: 15 MMHG

## 2021-01-07 PROCEDURE — 93325 DOPPLER ECHO COLOR FLOW MAPG: CPT | Mod: 26,,, | Performed by: INTERNAL MEDICINE

## 2021-01-07 PROCEDURE — 93351 STRESS TTE COMPLETE: CPT | Mod: 26,,, | Performed by: INTERNAL MEDICINE

## 2021-01-07 PROCEDURE — 25500020 PHARM REV CODE 255: Performed by: INTERNAL MEDICINE

## 2021-01-07 PROCEDURE — 93351 STRESS ECHO (CUPID ONLY): ICD-10-PCS | Mod: 26,,, | Performed by: INTERNAL MEDICINE

## 2021-01-07 PROCEDURE — 25000003 PHARM REV CODE 250: Performed by: INTERNAL MEDICINE

## 2021-01-07 PROCEDURE — 93320 STRESS ECHO (CUPID ONLY): ICD-10-PCS | Mod: 26,,, | Performed by: INTERNAL MEDICINE

## 2021-01-07 PROCEDURE — 93325 STRESS ECHO (CUPID ONLY): ICD-10-PCS | Mod: 26,,, | Performed by: INTERNAL MEDICINE

## 2021-01-07 PROCEDURE — 93320 DOPPLER ECHO COMPLETE: CPT

## 2021-01-07 PROCEDURE — A4216 STERILE WATER/SALINE, 10 ML: HCPCS | Performed by: INTERNAL MEDICINE

## 2021-01-07 PROCEDURE — 93320 DOPPLER ECHO COMPLETE: CPT | Mod: 26,,, | Performed by: INTERNAL MEDICINE

## 2021-01-07 RX ORDER — SODIUM CHLORIDE 0.9 % (FLUSH) 0.9 %
10 SYRINGE (ML) INJECTION
Status: DISCONTINUED | OUTPATIENT
Start: 2021-01-07 | End: 2021-12-07

## 2021-01-07 RX ADMIN — Medication 10 ML: at 08:01

## 2021-01-07 RX ADMIN — HUMAN ALBUMIN MICROSPHERES AND PERFLUTREN 0.66 MG: 10; .22 INJECTION, SOLUTION INTRAVENOUS at 08:01

## 2021-01-08 ENCOUNTER — TELEPHONE (OUTPATIENT)
Dept: CARDIOLOGY | Facility: CLINIC | Age: 51
End: 2021-01-08

## 2021-03-25 ENCOUNTER — TELEPHONE (OUTPATIENT)
Dept: ENDOSCOPY | Facility: HOSPITAL | Age: 51
End: 2021-03-25

## 2021-03-29 ENCOUNTER — OFFICE VISIT (OUTPATIENT)
Dept: CARDIOLOGY | Facility: CLINIC | Age: 51
End: 2021-03-29
Payer: COMMERCIAL

## 2021-03-29 VITALS
SYSTOLIC BLOOD PRESSURE: 110 MMHG | BODY MASS INDEX: 37.78 KG/M2 | HEIGHT: 70 IN | OXYGEN SATURATION: 97 % | HEART RATE: 71 BPM | DIASTOLIC BLOOD PRESSURE: 80 MMHG | RESPIRATION RATE: 16 BRPM | WEIGHT: 263.88 LBS

## 2021-03-29 DIAGNOSIS — E11.9 TYPE 2 DIABETES MELLITUS WITHOUT COMPLICATION, WITHOUT LONG-TERM CURRENT USE OF INSULIN: ICD-10-CM

## 2021-03-29 DIAGNOSIS — E11.69 HYPERLIPIDEMIA ASSOCIATED WITH TYPE 2 DIABETES MELLITUS: Primary | ICD-10-CM

## 2021-03-29 DIAGNOSIS — E66.01 SEVERE OBESITY (BMI 35.0-39.9) WITH COMORBIDITY: ICD-10-CM

## 2021-03-29 DIAGNOSIS — M1A.09X0 IDIOPATHIC CHRONIC GOUT OF MULTIPLE SITES WITHOUT TOPHUS: ICD-10-CM

## 2021-03-29 DIAGNOSIS — E78.5 HYPERLIPIDEMIA ASSOCIATED WITH TYPE 2 DIABETES MELLITUS: Primary | ICD-10-CM

## 2021-03-29 DIAGNOSIS — Z82.41 FAMILY HISTORY OF SUDDEN CARDIAC DEATH: ICD-10-CM

## 2021-03-29 PROCEDURE — 99999 PR PBB SHADOW E&M-EST. PATIENT-LVL III: ICD-10-PCS | Mod: PBBFAC,,, | Performed by: INTERNAL MEDICINE

## 2021-03-29 PROCEDURE — 99215 OFFICE O/P EST HI 40 MIN: CPT | Mod: S$GLB,,, | Performed by: INTERNAL MEDICINE

## 2021-03-29 PROCEDURE — 99215 PR OFFICE/OUTPT VISIT, EST, LEVL V, 40-54 MIN: ICD-10-PCS | Mod: S$GLB,,, | Performed by: INTERNAL MEDICINE

## 2021-03-29 PROCEDURE — 99999 PR PBB SHADOW E&M-EST. PATIENT-LVL III: CPT | Mod: PBBFAC,,, | Performed by: INTERNAL MEDICINE

## 2021-04-19 RX ORDER — TADALAFIL 20 MG/1
20 TABLET ORAL DAILY PRN
Qty: 10 TABLET | Refills: 0 | Status: SHIPPED | OUTPATIENT
Start: 2021-04-19 | End: 2022-03-07

## 2021-04-19 RX ORDER — LOVASTATIN 40 MG/1
40 TABLET ORAL DAILY
Qty: 90 TABLET | Refills: 3 | Status: SHIPPED | OUTPATIENT
Start: 2021-04-19 | End: 2022-05-10 | Stop reason: SDUPTHER

## 2021-05-08 ENCOUNTER — PATIENT MESSAGE (OUTPATIENT)
Dept: ENDOSCOPY | Facility: HOSPITAL | Age: 51
End: 2021-05-08

## 2021-08-04 ENCOUNTER — PATIENT MESSAGE (OUTPATIENT)
Dept: ADMINISTRATIVE | Facility: HOSPITAL | Age: 51
End: 2021-08-04

## 2021-08-14 ENCOUNTER — IMMUNIZATION (OUTPATIENT)
Dept: PRIMARY CARE CLINIC | Facility: CLINIC | Age: 51
End: 2021-08-14
Payer: COMMERCIAL

## 2021-08-14 DIAGNOSIS — Z23 NEED FOR VACCINATION: Primary | ICD-10-CM

## 2021-08-14 PROCEDURE — 0001A COVID-19, MRNA, LNP-S, PF, 30 MCG/0.3 ML DOSE VACCINE: CPT | Mod: CV19,S$GLB,, | Performed by: FAMILY MEDICINE

## 2021-08-14 PROCEDURE — 91300 COVID-19, MRNA, LNP-S, PF, 30 MCG/0.3 ML DOSE VACCINE: ICD-10-PCS | Mod: S$GLB,,, | Performed by: FAMILY MEDICINE

## 2021-08-14 PROCEDURE — 0001A COVID-19, MRNA, LNP-S, PF, 30 MCG/0.3 ML DOSE VACCINE: ICD-10-PCS | Mod: CV19,S$GLB,, | Performed by: FAMILY MEDICINE

## 2021-08-14 PROCEDURE — 91300 COVID-19, MRNA, LNP-S, PF, 30 MCG/0.3 ML DOSE VACCINE: CPT | Mod: S$GLB,,, | Performed by: FAMILY MEDICINE

## 2021-09-04 ENCOUNTER — IMMUNIZATION (OUTPATIENT)
Dept: PRIMARY CARE CLINIC | Facility: CLINIC | Age: 51
End: 2021-09-04
Payer: COMMERCIAL

## 2021-09-04 DIAGNOSIS — Z23 NEED FOR VACCINATION: Primary | ICD-10-CM

## 2021-09-04 PROCEDURE — 91300 COVID-19, MRNA, LNP-S, PF, 30 MCG/0.3 ML DOSE VACCINE: CPT | Mod: S$GLB,,, | Performed by: FAMILY MEDICINE

## 2021-09-04 PROCEDURE — 0002A COVID-19, MRNA, LNP-S, PF, 30 MCG/0.3 ML DOSE VACCINE: CPT | Mod: CV19,S$GLB,, | Performed by: FAMILY MEDICINE

## 2021-09-04 PROCEDURE — 0002A COVID-19, MRNA, LNP-S, PF, 30 MCG/0.3 ML DOSE VACCINE: ICD-10-PCS | Mod: CV19,S$GLB,, | Performed by: FAMILY MEDICINE

## 2021-09-04 PROCEDURE — 91300 COVID-19, MRNA, LNP-S, PF, 30 MCG/0.3 ML DOSE VACCINE: ICD-10-PCS | Mod: S$GLB,,, | Performed by: FAMILY MEDICINE

## 2021-10-13 ENCOUNTER — PATIENT MESSAGE (OUTPATIENT)
Dept: INTERNAL MEDICINE | Facility: CLINIC | Age: 51
End: 2021-10-13

## 2021-10-13 DIAGNOSIS — M1A.09X0 IDIOPATHIC CHRONIC GOUT OF MULTIPLE SITES WITHOUT TOPHUS: Primary | ICD-10-CM

## 2021-10-14 RX ORDER — ALLOPURINOL 300 MG/1
300 TABLET ORAL DAILY
Qty: 90 TABLET | Refills: 3 | Status: SHIPPED | OUTPATIENT
Start: 2021-10-14 | End: 2022-11-29

## 2021-10-14 RX ORDER — COLCHICINE 0.6 MG/1
TABLET ORAL
Qty: 30 TABLET | Refills: 0 | Status: SHIPPED | OUTPATIENT
Start: 2021-10-14 | End: 2022-05-10

## 2021-10-18 ENCOUNTER — PATIENT MESSAGE (OUTPATIENT)
Dept: ADMINISTRATIVE | Facility: HOSPITAL | Age: 51
End: 2021-10-18
Payer: COMMERCIAL

## 2021-12-02 ENCOUNTER — OFFICE VISIT (OUTPATIENT)
Dept: OPHTHALMOLOGY | Facility: CLINIC | Age: 51
End: 2021-12-02
Payer: COMMERCIAL

## 2021-12-02 DIAGNOSIS — E11.9 DIABETES MELLITUS TYPE 2 WITHOUT RETINOPATHY: Primary | ICD-10-CM

## 2021-12-02 DIAGNOSIS — H52.7 REFRACTIVE ERRORS: ICD-10-CM

## 2021-12-02 PROCEDURE — 99999 PR PBB SHADOW E&M-EST. PATIENT-LVL II: CPT | Mod: PBBFAC,,, | Performed by: OPTOMETRIST

## 2021-12-02 PROCEDURE — 92015 DETERMINE REFRACTIVE STATE: CPT | Mod: S$GLB,,, | Performed by: OPTOMETRIST

## 2021-12-02 PROCEDURE — 99999 PR PBB SHADOW E&M-EST. PATIENT-LVL II: ICD-10-PCS | Mod: PBBFAC,,, | Performed by: OPTOMETRIST

## 2021-12-02 PROCEDURE — 92004 COMPRE OPH EXAM NEW PT 1/>: CPT | Mod: S$GLB,,, | Performed by: OPTOMETRIST

## 2021-12-02 PROCEDURE — 92004 PR EYE EXAM, NEW PATIENT,COMPREHESV: ICD-10-PCS | Mod: S$GLB,,, | Performed by: OPTOMETRIST

## 2021-12-02 PROCEDURE — 92015 PR REFRACTION: ICD-10-PCS | Mod: S$GLB,,, | Performed by: OPTOMETRIST

## 2021-12-07 ENCOUNTER — PATIENT MESSAGE (OUTPATIENT)
Dept: ADMINISTRATIVE | Facility: HOSPITAL | Age: 51
End: 2021-12-07
Payer: COMMERCIAL

## 2021-12-07 ENCOUNTER — TELEPHONE (OUTPATIENT)
Dept: CARDIOLOGY | Facility: CLINIC | Age: 51
End: 2021-12-07
Payer: COMMERCIAL

## 2021-12-07 ENCOUNTER — OFFICE VISIT (OUTPATIENT)
Dept: INTERNAL MEDICINE | Facility: CLINIC | Age: 51
End: 2021-12-07
Payer: COMMERCIAL

## 2021-12-07 ENCOUNTER — LAB VISIT (OUTPATIENT)
Dept: LAB | Facility: HOSPITAL | Age: 51
End: 2021-12-07
Attending: FAMILY MEDICINE
Payer: COMMERCIAL

## 2021-12-07 VITALS
SYSTOLIC BLOOD PRESSURE: 120 MMHG | BODY MASS INDEX: 39.8 KG/M2 | WEIGHT: 278 LBS | OXYGEN SATURATION: 98 % | DIASTOLIC BLOOD PRESSURE: 86 MMHG | HEART RATE: 89 BPM | HEIGHT: 70 IN | TEMPERATURE: 97 F

## 2021-12-07 DIAGNOSIS — Z12.5 SCREENING FOR PROSTATE CANCER: ICD-10-CM

## 2021-12-07 DIAGNOSIS — Z00.00 ANNUAL PHYSICAL EXAM: ICD-10-CM

## 2021-12-07 DIAGNOSIS — E66.01 SEVERE OBESITY (BMI 35.0-39.9) WITH COMORBIDITY: ICD-10-CM

## 2021-12-07 DIAGNOSIS — M1A.09X0 IDIOPATHIC CHRONIC GOUT OF MULTIPLE SITES WITHOUT TOPHUS: ICD-10-CM

## 2021-12-07 DIAGNOSIS — B35.3 TINEA PEDIS OF LEFT FOOT: ICD-10-CM

## 2021-12-07 DIAGNOSIS — B00.1 RECURRENT COLD SORES: ICD-10-CM

## 2021-12-07 DIAGNOSIS — Z00.00 ANNUAL PHYSICAL EXAM: Primary | ICD-10-CM

## 2021-12-07 DIAGNOSIS — Z12.11 SCREENING FOR COLON CANCER: ICD-10-CM

## 2021-12-07 DIAGNOSIS — E11.69 HYPERLIPIDEMIA ASSOCIATED WITH TYPE 2 DIABETES MELLITUS: ICD-10-CM

## 2021-12-07 DIAGNOSIS — E78.5 HYPERLIPIDEMIA ASSOCIATED WITH TYPE 2 DIABETES MELLITUS: ICD-10-CM

## 2021-12-07 DIAGNOSIS — E11.9 TYPE 2 DIABETES MELLITUS WITHOUT COMPLICATION, WITHOUT LONG-TERM CURRENT USE OF INSULIN: ICD-10-CM

## 2021-12-07 LAB
ALBUMIN SERPL BCP-MCNC: 3.9 G/DL (ref 3.5–5.2)
ALP SERPL-CCNC: 68 U/L (ref 55–135)
ALT SERPL W/O P-5'-P-CCNC: 37 U/L (ref 10–44)
ANION GAP SERPL CALC-SCNC: 15 MMOL/L (ref 8–16)
AST SERPL-CCNC: 27 U/L (ref 10–40)
BILIRUB SERPL-MCNC: 0.6 MG/DL (ref 0.1–1)
BUN SERPL-MCNC: 14 MG/DL (ref 6–20)
CALCIUM SERPL-MCNC: 9.2 MG/DL (ref 8.7–10.5)
CHLORIDE SERPL-SCNC: 98 MMOL/L (ref 95–110)
CHOLEST SERPL-MCNC: 166 MG/DL (ref 120–199)
CHOLEST/HDLC SERPL: 5.2 {RATIO} (ref 2–5)
CO2 SERPL-SCNC: 23 MMOL/L (ref 23–29)
COMPLEXED PSA SERPL-MCNC: 0.84 NG/ML (ref 0–4)
CREAT SERPL-MCNC: 0.9 MG/DL (ref 0.5–1.4)
ERYTHROCYTE [DISTWIDTH] IN BLOOD BY AUTOMATED COUNT: 12.5 % (ref 11.5–14.5)
EST. GFR  (AFRICAN AMERICAN): >60 ML/MIN/1.73 M^2
EST. GFR  (NON AFRICAN AMERICAN): >60 ML/MIN/1.73 M^2
ESTIMATED AVG GLUCOSE: 212 MG/DL (ref 68–131)
GLUCOSE SERPL-MCNC: 213 MG/DL (ref 70–110)
HBA1C MFR BLD: 9 % (ref 4–5.6)
HCT VFR BLD AUTO: 43.9 % (ref 40–54)
HDLC SERPL-MCNC: 32 MG/DL (ref 40–75)
HDLC SERPL: 19.3 % (ref 20–50)
HGB BLD-MCNC: 15.2 G/DL (ref 14–18)
LDLC SERPL CALC-MCNC: ABNORMAL MG/DL (ref 63–159)
MCH RBC QN AUTO: 30.5 PG (ref 27–31)
MCHC RBC AUTO-ENTMCNC: 34.6 G/DL (ref 32–36)
MCV RBC AUTO: 88 FL (ref 82–98)
NONHDLC SERPL-MCNC: 134 MG/DL
PLATELET # BLD AUTO: 209 K/UL (ref 150–450)
PMV BLD AUTO: 10.5 FL (ref 9.2–12.9)
POTASSIUM SERPL-SCNC: 4.7 MMOL/L (ref 3.5–5.1)
PROT SERPL-MCNC: 7 G/DL (ref 6–8.4)
RBC # BLD AUTO: 4.98 M/UL (ref 4.6–6.2)
SODIUM SERPL-SCNC: 136 MMOL/L (ref 136–145)
TRIGL SERPL-MCNC: 499 MG/DL (ref 30–150)
TSH SERPL DL<=0.005 MIU/L-ACNC: 2.45 UIU/ML (ref 0.4–4)
URATE SERPL-MCNC: 6.5 MG/DL (ref 3.4–7)
WBC # BLD AUTO: 9.69 K/UL (ref 3.9–12.7)

## 2021-12-07 PROCEDURE — 36415 COLL VENOUS BLD VENIPUNCTURE: CPT | Performed by: FAMILY MEDICINE

## 2021-12-07 PROCEDURE — 99999 PR PBB SHADOW E&M-EST. PATIENT-LVL III: CPT | Mod: PBBFAC,,, | Performed by: FAMILY MEDICINE

## 2021-12-07 PROCEDURE — 83036 HEMOGLOBIN GLYCOSYLATED A1C: CPT | Performed by: FAMILY MEDICINE

## 2021-12-07 PROCEDURE — 99396 PR PREVENTIVE VISIT,EST,40-64: ICD-10-PCS | Mod: S$GLB,,, | Performed by: FAMILY MEDICINE

## 2021-12-07 PROCEDURE — 84443 ASSAY THYROID STIM HORMONE: CPT | Performed by: FAMILY MEDICINE

## 2021-12-07 PROCEDURE — 84153 ASSAY OF PSA TOTAL: CPT | Performed by: FAMILY MEDICINE

## 2021-12-07 PROCEDURE — 99999 PR PBB SHADOW E&M-EST. PATIENT-LVL III: ICD-10-PCS | Mod: PBBFAC,,, | Performed by: FAMILY MEDICINE

## 2021-12-07 PROCEDURE — 99396 PREV VISIT EST AGE 40-64: CPT | Mod: S$GLB,,, | Performed by: FAMILY MEDICINE

## 2021-12-07 PROCEDURE — 80053 COMPREHEN METABOLIC PANEL: CPT | Performed by: FAMILY MEDICINE

## 2021-12-07 PROCEDURE — 80061 LIPID PANEL: CPT | Performed by: FAMILY MEDICINE

## 2021-12-07 PROCEDURE — 84550 ASSAY OF BLOOD/URIC ACID: CPT | Performed by: FAMILY MEDICINE

## 2021-12-07 PROCEDURE — 85027 COMPLETE CBC AUTOMATED: CPT | Performed by: FAMILY MEDICINE

## 2021-12-07 RX ORDER — EZETIMIBE 10 MG/1
10 TABLET ORAL DAILY
Qty: 90 TABLET | Refills: 3 | Status: SHIPPED | OUTPATIENT
Start: 2021-12-07 | End: 2022-05-10 | Stop reason: SDUPTHER

## 2021-12-07 RX ORDER — METFORMIN HYDROCHLORIDE 500 MG/1
500 TABLET, EXTENDED RELEASE ORAL 2 TIMES DAILY WITH MEALS
Qty: 180 TABLET | Refills: 3 | Status: SHIPPED | OUTPATIENT
Start: 2021-12-07 | End: 2021-12-08 | Stop reason: SDUPTHER

## 2021-12-07 RX ORDER — PRENATAL VIT 91/IRON/FOLIC/DHA 28-975-200
COMBINATION PACKAGE (EA) ORAL 2 TIMES DAILY
Qty: 30 G | Refills: 3 | Status: SHIPPED | OUTPATIENT
Start: 2021-12-07 | End: 2022-05-10

## 2021-12-07 RX ORDER — GLIMEPIRIDE 2 MG/1
2 TABLET ORAL
Qty: 90 TABLET | Refills: 3 | Status: SHIPPED | OUTPATIENT
Start: 2021-12-07 | End: 2021-12-08

## 2021-12-08 ENCOUNTER — TELEPHONE (OUTPATIENT)
Dept: CARDIOLOGY | Facility: CLINIC | Age: 51
End: 2021-12-08
Payer: COMMERCIAL

## 2021-12-08 DIAGNOSIS — E78.1 HYPERTRIGLYCERIDEMIA: Primary | ICD-10-CM

## 2021-12-08 DIAGNOSIS — E78.5 HYPERLIPIDEMIA ASSOCIATED WITH TYPE 2 DIABETES MELLITUS: ICD-10-CM

## 2021-12-08 DIAGNOSIS — E78.00 HYPERCHOLESTEROLEMIA: ICD-10-CM

## 2021-12-08 DIAGNOSIS — E11.65 TYPE 2 DIABETES MELLITUS WITH HYPERGLYCEMIA, WITHOUT LONG-TERM CURRENT USE OF INSULIN: ICD-10-CM

## 2021-12-08 DIAGNOSIS — E11.69 HYPERLIPIDEMIA ASSOCIATED WITH TYPE 2 DIABETES MELLITUS: ICD-10-CM

## 2021-12-08 RX ORDER — METFORMIN HYDROCHLORIDE 500 MG/1
1000 TABLET, EXTENDED RELEASE ORAL 2 TIMES DAILY WITH MEALS
Qty: 360 TABLET | Refills: 3 | Status: SHIPPED | OUTPATIENT
Start: 2021-12-08 | End: 2022-05-10

## 2021-12-08 RX ORDER — SEMAGLUTIDE 1.34 MG/ML
0.25 INJECTION, SOLUTION SUBCUTANEOUS
Qty: 1 PEN | Refills: 0 | Status: SHIPPED | OUTPATIENT
Start: 2021-12-08 | End: 2022-05-10 | Stop reason: SDUPTHER

## 2021-12-08 RX ORDER — ICOSAPENT ETHYL 1000 MG/1
2 CAPSULE ORAL 2 TIMES DAILY
Qty: 120 CAPSULE | Refills: 6 | Status: SHIPPED | OUTPATIENT
Start: 2021-12-08 | End: 2022-05-10 | Stop reason: SDUPTHER

## 2021-12-28 ENCOUNTER — PATIENT MESSAGE (OUTPATIENT)
Dept: INTERNAL MEDICINE | Facility: CLINIC | Age: 51
End: 2021-12-28
Payer: COMMERCIAL

## 2021-12-28 RX ORDER — VALACYCLOVIR HYDROCHLORIDE 1 G/1
1000 TABLET, FILM COATED ORAL DAILY
Qty: 90 TABLET | Refills: 0 | Status: SHIPPED | OUTPATIENT
Start: 2021-12-28 | End: 2023-06-05 | Stop reason: SDUPTHER

## 2022-01-04 LAB — NONINV COLON CA DNA+OCC BLD SCRN STL QL: NEGATIVE

## 2022-02-08 ENCOUNTER — PATIENT OUTREACH (OUTPATIENT)
Dept: ADMINISTRATIVE | Facility: HOSPITAL | Age: 52
End: 2022-02-08
Payer: COMMERCIAL

## 2022-02-08 NOTE — PROGRESS NOTES
Working Dm Report:       Last A1c elevated in 12/2021 Pt due again 3/22. Sent message for patient to schedule. Lab only appt..

## 2022-03-03 NOTE — TELEPHONE ENCOUNTER
No new care gaps identified.  Powered by [employer]. Reference number: [id number].   3/03/2022 10:58:41 AM CST

## 2022-03-07 RX ORDER — TADALAFIL 20 MG/1
20 TABLET ORAL DAILY PRN
Qty: 6 TABLET | Refills: 59 | Status: SHIPPED | OUTPATIENT
Start: 2022-03-07 | End: 2023-04-19

## 2022-03-07 NOTE — TELEPHONE ENCOUNTER
Refill Authorization Note   Andrade Barahona  is requesting a refill authorization.  Brief Assessment and Rationale for Refill:  Approve     Medication Therapy Plan:       Medication Reconciliation Completed: No   Comments:   --->Care Gap information included below if applicable.       Requested Prescriptions   Pending Prescriptions Disp Refills    tadalafiL (CIALIS) 20 MG Tab [Pharmacy Med Name: TADALAFIL 20 MG TABLET] 6 tablet 59     Sig: TAKE 1 TABLET (20 MG TOTAL) BY MOUTH DAILY AS NEEDED (FOR ERECTILE DYSFUNCTION).       Urology: Erectile Dysfunction Agents Passed - 3/7/2022  5:36 PM        Passed - Patient is at least 18 years old        Passed - Nitrates are not on the active medication list        Passed - BP > 90/50 mmH     BP Readings from Last 1 Encounters:   12/07/21 120/86               Passed - Valid encounter within last 15 months     Recent Visits  Date Type Provider Dept   12/07/21 Office Visit Stan Wang MD [facility]   12/09/20 Office Visit Stan Wang MD [facility]   Showing recent visits within past 720 days and meeting all other requirements  Future Appointments  No visits were found meeting these conditions.  Showing future appointments within next 150 days and meeting all other requirements                    Appointments  past 12m or future 3m with PCP    Date Provider   Last Visit   12/7/2021 Stan Wang MD   Next Visit   Visit date not found Stan Wang MD   ED visits in past 90 days: 0     Note composed:5:38 PM 03/07/2022

## 2022-04-21 ENCOUNTER — PATIENT OUTREACH (OUTPATIENT)
Dept: ADMINISTRATIVE | Facility: HOSPITAL | Age: 52
End: 2022-04-21
Payer: COMMERCIAL

## 2022-04-21 DIAGNOSIS — E11.9 TYPE 2 DIABETES MELLITUS WITHOUT COMPLICATION, WITHOUT LONG-TERM CURRENT USE OF INSULIN: Primary | ICD-10-CM

## 2022-04-21 NOTE — PROGRESS NOTES
Working Diabetes Report.     [facility]-no results found.  [facility]-no results found.  Care Everywhere-no recent results found.    Advised due A1C. appt scheduled, 4/22/22 at [facility] per pt request.  Pt states he did not start new med-ozempic, so knows A1C will not be good.  Follow up appt scheduled, 5/10/22.

## 2022-04-22 ENCOUNTER — LAB VISIT (OUTPATIENT)
Dept: LAB | Facility: HOSPITAL | Age: 52
End: 2022-04-22
Attending: FAMILY MEDICINE
Payer: COMMERCIAL

## 2022-04-22 DIAGNOSIS — E11.9 TYPE 2 DIABETES MELLITUS WITHOUT COMPLICATION, WITHOUT LONG-TERM CURRENT USE OF INSULIN: ICD-10-CM

## 2022-04-22 LAB
ESTIMATED AVG GLUCOSE: 275 MG/DL (ref 68–131)
HBA1C MFR BLD: 11.2 % (ref 4–5.6)

## 2022-04-22 PROCEDURE — 36415 COLL VENOUS BLD VENIPUNCTURE: CPT | Mod: PO | Performed by: FAMILY MEDICINE

## 2022-04-22 PROCEDURE — 83036 HEMOGLOBIN GLYCOSYLATED A1C: CPT | Performed by: FAMILY MEDICINE

## 2022-05-10 ENCOUNTER — OFFICE VISIT (OUTPATIENT)
Dept: INTERNAL MEDICINE | Facility: CLINIC | Age: 52
End: 2022-05-10
Payer: COMMERCIAL

## 2022-05-10 VITALS
WEIGHT: 265.88 LBS | HEIGHT: 70 IN | SYSTOLIC BLOOD PRESSURE: 118 MMHG | OXYGEN SATURATION: 96 % | HEART RATE: 91 BPM | TEMPERATURE: 98 F | BODY MASS INDEX: 38.06 KG/M2 | DIASTOLIC BLOOD PRESSURE: 92 MMHG

## 2022-05-10 DIAGNOSIS — E66.01 SEVERE OBESITY (BMI 35.0-39.9) WITH COMORBIDITY: Chronic | ICD-10-CM

## 2022-05-10 DIAGNOSIS — E11.69 HYPERLIPIDEMIA ASSOCIATED WITH TYPE 2 DIABETES MELLITUS: ICD-10-CM

## 2022-05-10 DIAGNOSIS — E78.5 HYPERLIPIDEMIA ASSOCIATED WITH TYPE 2 DIABETES MELLITUS: ICD-10-CM

## 2022-05-10 DIAGNOSIS — E11.65 TYPE 2 DIABETES MELLITUS WITH HYPERGLYCEMIA, WITHOUT LONG-TERM CURRENT USE OF INSULIN: Primary | ICD-10-CM

## 2022-05-10 PROCEDURE — 99215 PR OFFICE/OUTPT VISIT, EST, LEVL V, 40-54 MIN: ICD-10-PCS | Mod: S$GLB,,, | Performed by: FAMILY MEDICINE

## 2022-05-10 PROCEDURE — 99215 OFFICE O/P EST HI 40 MIN: CPT | Mod: S$GLB,,, | Performed by: FAMILY MEDICINE

## 2022-05-10 PROCEDURE — 99999 PR PBB SHADOW E&M-EST. PATIENT-LVL III: ICD-10-PCS | Mod: PBBFAC,,, | Performed by: FAMILY MEDICINE

## 2022-05-10 PROCEDURE — 99999 PR PBB SHADOW E&M-EST. PATIENT-LVL III: CPT | Mod: PBBFAC,,, | Performed by: FAMILY MEDICINE

## 2022-05-10 RX ORDER — EZETIMIBE 10 MG/1
10 TABLET ORAL DAILY
Qty: 90 TABLET | Refills: 3 | Status: SHIPPED | OUTPATIENT
Start: 2022-05-10 | End: 2023-05-06 | Stop reason: SDUPTHER

## 2022-05-10 RX ORDER — INSULIN PUMP SYRINGE, 3 ML
EACH MISCELLANEOUS
Qty: 1 EACH | Refills: 0 | Status: SHIPPED | OUTPATIENT
Start: 2022-05-10 | End: 2023-11-06

## 2022-05-10 RX ORDER — METFORMIN HYDROCHLORIDE 500 MG/1
500 TABLET, EXTENDED RELEASE ORAL 2 TIMES DAILY WITH MEALS
Start: 2022-05-10 | End: 2022-10-20 | Stop reason: SDUPTHER

## 2022-05-10 RX ORDER — LOVASTATIN 40 MG/1
40 TABLET ORAL DAILY
Qty: 90 TABLET | Refills: 3 | Status: SHIPPED | OUTPATIENT
Start: 2022-05-10 | End: 2023-04-10

## 2022-05-10 RX ORDER — LANCETS
EACH MISCELLANEOUS
Qty: 200 EACH | Refills: 3 | Status: SHIPPED | OUTPATIENT
Start: 2022-05-10 | End: 2023-11-06

## 2022-05-10 RX ORDER — ICOSAPENT ETHYL 1000 MG/1
2 CAPSULE ORAL 2 TIMES DAILY
Qty: 360 CAPSULE | Refills: 3 | Status: SHIPPED | OUTPATIENT
Start: 2022-05-10 | End: 2023-04-10

## 2022-05-10 RX ORDER — SEMAGLUTIDE 1.34 MG/ML
0.25 INJECTION, SOLUTION SUBCUTANEOUS
Qty: 1 PEN | Refills: 0 | Status: SHIPPED | OUTPATIENT
Start: 2022-05-10 | End: 2022-06-19 | Stop reason: SDUPTHER

## 2022-05-10 NOTE — PROGRESS NOTES
Subjective:   Patient ID: Andrade Barahona is a 51 y.o. male.  Chief Complaint:  No chief complaint on file.    Presents for overdue follow-up on chronic medical conditions     Last visit December 2021 for annual physical exam  A1c greater than 8% and DM not controlled   Blood Counts are normal. No significant anemia.  PSA level is within the normal range.  No suspicions for prostate cancer.   Thyroid testing is normal.  Kidney, Liver, and Electrolyte tests are all normal.  Lipid panel with triglycerides greater than 400 LDL unable to be calculated    Medical history for:  - Diabetes mellitus.  Last [id number] than 8%. Discontinued Amaryl, increase metformin 1000 mg twice a day, add Ozempic weekly injections.  Unfortunately patient not compliant and currently taking only metformin on an irregular basis.  A1c done prior to visit now even worse at 11.2%.  Starting to have some symptoms of hyperglycemia.  No hypoglycemia.  - Hyperlipidemia.  On Mevacor 40 mg daily and Zetia 10 mg daily with addition of Vascepa by Cardiology after last elevated triglyceride test.  Noncompliance. No chest pain or claudication.  - Gout.  On allopurinol 300 mg daily.  Only 1 acute attack since last exam.  As needed Colcrys was effective.    - Fever blister/cold sores.  Valtrex 1000 mg 2 tablets 12 hours apart as needed for each outbreak  - Erectile dysfunction.  Cialis as needed.  - Family history for Sudden cardiac death in 2 family members father and grandfather.  Unclear etiology.  Questionable heart valve/ structural aided versus coronary artery disease.  Both were asymptomatic.  Had evaluation by Cardiology in the past year. Other than continue medications for hyperlipidemia, no other treatment recommended    Health maintenance needs include:  Tetanus, shingles, and COVID vaccines      Current Outpatient Medications:     allopurinoL (ZYLOPRIM) 300 MG tablet, Take 1 tablet (300 mg total) by mouth once daily., Disp: 90 tablet, Rfl: 3     tadalafiL (CIALIS) 20 MG Tab, TAKE 1 TABLET (20 MG TOTAL) BY MOUTH DAILY AS NEEDED (FOR ERECTILE DYSFUNCTION)., Disp: 6 tablet, Rfl: 59    blood sugar diagnostic Strp, To check BG two times daily, to use with insurance preferred meter, Disp: 200 each, Rfl: 3    blood-glucose meter kit, To check BG two times daily, to use with insurance preferred meter, Disp: 1 each, Rfl: 0    ezetimibe (ZETIA) 10 mg tablet, Take 1 tablet (10 mg total) by mouth once daily., Disp: 90 tablet, Rfl: 3    icosapent ethyL (VASCEPA) 1 gram Cap, Take 2 capsules (2 g total) by mouth 2 (two) times daily., Disp: 360 capsule, Rfl: 3    lancets Misc, To check BG two times daily, to use with insurance preferred meter, Disp: 200 each, Rfl: 3    lovastatin (MEVACOR) 40 MG tablet, Take 1 tablet (40 mg total) by mouth once daily., Disp: 90 tablet, Rfl: 3    metFORMIN (GLUCOPHAGE-XR) 500 MG ER 24hr tablet, Take 1 tablet (500 mg total) by mouth 2 (two) times daily with meals., Disp: , Rfl:     semaglutide (OZEMPIC) 0.25 mg or 0.5 mg(2 mg/1.5 mL) pen injector, Inject 0.25 mg into the skin every 7 days., Disp: 1 pen, Rfl: 0    valACYclovir (VALTREX) 1000 MG tablet, Take 1 tablet (1,000 mg total) by mouth once daily., Disp: 90 tablet, Rfl: 0    Review of Systems   Constitutional: Positive for fever. Negative for chills, diaphoresis and fatigue.   Eyes: Negative for visual disturbance.   Respiratory: Negative for cough, chest tightness, shortness of breath and wheezing.    Cardiovascular: Negative for chest pain, palpitations and leg swelling.   Gastrointestinal: Negative for abdominal pain, constipation, diarrhea, nausea and vomiting.   Endocrine: Positive for polydipsia, polyphagia and polyuria. Negative for cold intolerance and heat intolerance.   Genitourinary: Negative for difficulty urinating.   Musculoskeletal: Negative for myalgias and neck pain.   Skin: Negative for rash.   Neurological: Negative for dizziness, tremors, syncope, weakness,  "light-headedness, numbness and headaches.   Hematological: Does not bruise/bleed easily.   Psychiatric/Behavioral: Negative for sleep disturbance. The patient is not nervous/anxious.      Objective:   BP (!) 118/92   Pulse 91   Temp 97.5 °F (36.4 °C)   Ht 5' 10" (1.778 m)   Wt 120.6 kg (265 lb 14 oz)   SpO2 96%   BMI 38.15 kg/m²     Physical Exam  Vitals and nursing note reviewed.   Constitutional:       Appearance: Normal appearance. He is well-developed. He is obese.   Neck:      Thyroid: No thyroid mass, thyromegaly or thyroid tenderness.      Vascular: No JVD.   Cardiovascular:      Rate and Rhythm: Normal rate and regular rhythm.      Pulses: Normal pulses.      Heart sounds: Normal heart sounds. No murmur heard.    No friction rub. No gallop.   Pulmonary:      Effort: Pulmonary effort is normal.      Breath sounds: Normal breath sounds. No wheezing, rhonchi or rales.   Abdominal:      General: There is no distension.      Palpations: Abdomen is soft.      Tenderness: There is no abdominal tenderness. There is no guarding or rebound.   Musculoskeletal:      Right lower leg: No edema.      Left lower leg: No edema.   Skin:     Findings: No rash.   Neurological:      Mental Status: He is alert.   Psychiatric:         Mood and Affect: Mood and affect normal.       Assessment:       ICD-10-CM ICD-9-CM   1. Type 2 diabetes mellitus with hyperglycemia, without long-term current use of insulin  E11.65 250.00     790.29   2. Severe obesity (BMI 35.0-39.9) with comorbidity  E66.01 278.01   3. Hyperlipidemia associated with type 2 diabetes mellitus  E11.69 250.80    E78.5 272.4     Plan:   Type 2 diabetes mellitus with hyperglycemia, without long-term current use of insulin  Severe obesity (BMI 35.0-39.9) with comorbidity  -     metFORMIN (GLUCOPHAGE-XR) 500 MG ER 24hr tablet; Take 1 tablet (500 mg total) by mouth 2 (two) times daily with meals.  -     semaglutide (OZEMPIC) 0.25 mg or 0.5 mg(2 mg/1.5 mL) pen " injector; Inject 0.25 mg into the skin every 7 days.  Dispense: 1 pen; Refill: 0  -     blood-glucose meter kit; To check BG two times daily, to use with insurance preferred meter  Dispense: 1 each; Refill: 0  -     lancets Misc; To check BG two times daily, to use with insurance preferred meter  Dispense: 200 each; Refill: 3  -     blood sugar diagnostic Strp; To check BG two times daily, to use with insurance preferred meter  Dispense: 200 each; Refill: 3  Diabetes uncontrolled due to noncompliance  Agrees to take metformin 500 mg twice a day  Agrees to start Ozempic 0.25 mg weekly injection and increase in 2 weeks to fit 0.5 mg if tolerated  Check blood glucose fasting and 2 hours postprandial  Return to clinic in 6 weeks with readings  Additional adjust medications at that time    Hyperlipidemia associated with type 2 diabetes mellitus  -     lovastatin (MEVACOR) 40 MG tablet; Take 1 tablet (40 mg total) by mouth once daily.  Dispense: 90 tablet; Refill: 3  -     ezetimibe (ZETIA) 10 mg tablet; Take 1 tablet (10 mg total) by mouth once daily.  Dispense: 90 tablet; Refill: 3  -     icosapent ethyL (VASCEPA) 1 gram Cap; Take 2 capsules (2 g total) by mouth 2 (two) times daily.  Dispense: 360 capsule; Refill: 3  Agrees to take all medications as previously recommended  Recheck CMP and lipid panel 3 months    Return to clinic 6 weeks     40+ minutes of total time spent on the encounter, which includes face to face time and non-face to face time preparing to see the patient (eg, review of tests), Obtaining and/or reviewing separately obtained history, documenting clinical information in the electronic or other health record, independently interpreting results (not separately reported) and communicating results to the patient/family/caregiver, or Care coordination (not separately reported).

## 2022-05-11 PROBLEM — E11.65 TYPE 2 DIABETES MELLITUS WITH HYPERGLYCEMIA, WITHOUT LONG-TERM CURRENT USE OF INSULIN: Status: ACTIVE | Noted: 2020-12-09

## 2022-06-19 DIAGNOSIS — E11.65 TYPE 2 DIABETES MELLITUS WITH HYPERGLYCEMIA, WITHOUT LONG-TERM CURRENT USE OF INSULIN: ICD-10-CM

## 2022-06-20 RX ORDER — SEMAGLUTIDE 1.34 MG/ML
0.25 INJECTION, SOLUTION SUBCUTANEOUS
Qty: 1 PEN | Refills: 0 | Status: SHIPPED | OUTPATIENT
Start: 2022-06-20 | End: 2022-07-19

## 2022-06-20 NOTE — TELEPHONE ENCOUNTER
Refill Authorization Note   Andrade Barahona  is requesting a refill authorization.  Brief Assessment and Rationale for Refill:  Approve     Medication Therapy Plan:       Medication Reconciliation Completed: No   Comments:     No Care Gaps Recommended     Note composed:1:02 PM 06/20/2022

## 2022-07-08 ENCOUNTER — PATIENT MESSAGE (OUTPATIENT)
Dept: INTERNAL MEDICINE | Facility: CLINIC | Age: 52
End: 2022-07-08
Payer: COMMERCIAL

## 2022-07-13 DIAGNOSIS — E11.69 HYPERLIPIDEMIA ASSOCIATED WITH TYPE 2 DIABETES MELLITUS: ICD-10-CM

## 2022-07-13 DIAGNOSIS — E78.5 HYPERLIPIDEMIA ASSOCIATED WITH TYPE 2 DIABETES MELLITUS: ICD-10-CM

## 2022-07-13 DIAGNOSIS — E11.65 TYPE 2 DIABETES MELLITUS WITH HYPERGLYCEMIA, WITHOUT LONG-TERM CURRENT USE OF INSULIN: Primary | ICD-10-CM

## 2022-07-18 ENCOUNTER — LAB VISIT (OUTPATIENT)
Dept: LAB | Facility: HOSPITAL | Age: 52
End: 2022-07-18
Attending: FAMILY MEDICINE
Payer: COMMERCIAL

## 2022-07-18 DIAGNOSIS — E78.5 HYPERLIPIDEMIA ASSOCIATED WITH TYPE 2 DIABETES MELLITUS: ICD-10-CM

## 2022-07-18 DIAGNOSIS — E11.69 HYPERLIPIDEMIA ASSOCIATED WITH TYPE 2 DIABETES MELLITUS: ICD-10-CM

## 2022-07-18 DIAGNOSIS — E11.65 TYPE 2 DIABETES MELLITUS WITH HYPERGLYCEMIA, WITHOUT LONG-TERM CURRENT USE OF INSULIN: ICD-10-CM

## 2022-07-18 LAB
ALBUMIN SERPL BCP-MCNC: 4.1 G/DL (ref 3.5–5.2)
ALP SERPL-CCNC: 46 U/L (ref 55–135)
ALT SERPL W/O P-5'-P-CCNC: 36 U/L (ref 10–44)
ANION GAP SERPL CALC-SCNC: 10 MMOL/L (ref 8–16)
AST SERPL-CCNC: 23 U/L (ref 10–40)
BILIRUB SERPL-MCNC: 0.5 MG/DL (ref 0.1–1)
BUN SERPL-MCNC: 11 MG/DL (ref 6–20)
CALCIUM SERPL-MCNC: 9.1 MG/DL (ref 8.7–10.5)
CHLORIDE SERPL-SCNC: 103 MMOL/L (ref 95–110)
CHOLEST SERPL-MCNC: 109 MG/DL (ref 120–199)
CHOLEST/HDLC SERPL: 3.5 {RATIO} (ref 2–5)
CO2 SERPL-SCNC: 22 MMOL/L (ref 23–29)
CREAT SERPL-MCNC: 0.8 MG/DL (ref 0.5–1.4)
EST. GFR  (AFRICAN AMERICAN): >60 ML/MIN/1.73 M^2
EST. GFR  (NON AFRICAN AMERICAN): >60 ML/MIN/1.73 M^2
ESTIMATED AVG GLUCOSE: 206 MG/DL (ref 68–131)
GLUCOSE SERPL-MCNC: 174 MG/DL (ref 70–110)
HBA1C MFR BLD: 8.8 % (ref 4–5.6)
HDLC SERPL-MCNC: 31 MG/DL (ref 40–75)
HDLC SERPL: 28.4 % (ref 20–50)
LDLC SERPL CALC-MCNC: 42.2 MG/DL (ref 63–159)
NONHDLC SERPL-MCNC: 78 MG/DL
POTASSIUM SERPL-SCNC: 4.4 MMOL/L (ref 3.5–5.1)
PROT SERPL-MCNC: 6.8 G/DL (ref 6–8.4)
SODIUM SERPL-SCNC: 135 MMOL/L (ref 136–145)
TRIGL SERPL-MCNC: 179 MG/DL (ref 30–150)

## 2022-07-18 PROCEDURE — 80061 LIPID PANEL: CPT | Performed by: FAMILY MEDICINE

## 2022-07-18 PROCEDURE — 36415 COLL VENOUS BLD VENIPUNCTURE: CPT | Mod: PO | Performed by: FAMILY MEDICINE

## 2022-07-18 PROCEDURE — 80053 COMPREHEN METABOLIC PANEL: CPT | Performed by: FAMILY MEDICINE

## 2022-07-18 PROCEDURE — 83036 HEMOGLOBIN GLYCOSYLATED A1C: CPT | Performed by: FAMILY MEDICINE

## 2022-07-19 ENCOUNTER — OFFICE VISIT (OUTPATIENT)
Dept: INTERNAL MEDICINE | Facility: CLINIC | Age: 52
End: 2022-07-19
Payer: COMMERCIAL

## 2022-07-19 VITALS
BODY MASS INDEX: 37.5 KG/M2 | SYSTOLIC BLOOD PRESSURE: 118 MMHG | DIASTOLIC BLOOD PRESSURE: 80 MMHG | HEIGHT: 70 IN | HEART RATE: 90 BPM | OXYGEN SATURATION: 96 % | TEMPERATURE: 97 F | WEIGHT: 261.94 LBS

## 2022-07-19 DIAGNOSIS — E11.69 HYPERLIPIDEMIA ASSOCIATED WITH TYPE 2 DIABETES MELLITUS: ICD-10-CM

## 2022-07-19 DIAGNOSIS — E66.01 SEVERE OBESITY (BMI 35.0-39.9) WITH COMORBIDITY: ICD-10-CM

## 2022-07-19 DIAGNOSIS — E78.5 HYPERLIPIDEMIA ASSOCIATED WITH TYPE 2 DIABETES MELLITUS: ICD-10-CM

## 2022-07-19 DIAGNOSIS — E11.65 TYPE 2 DIABETES MELLITUS WITH HYPERGLYCEMIA, WITHOUT LONG-TERM CURRENT USE OF INSULIN: Primary | ICD-10-CM

## 2022-07-19 PROCEDURE — 99214 OFFICE O/P EST MOD 30 MIN: CPT | Mod: S$GLB,,, | Performed by: FAMILY MEDICINE

## 2022-07-19 PROCEDURE — 99999 PR PBB SHADOW E&M-EST. PATIENT-LVL IV: CPT | Mod: PBBFAC,,, | Performed by: FAMILY MEDICINE

## 2022-07-19 PROCEDURE — 99214 PR OFFICE/OUTPT VISIT, EST, LEVL IV, 30-39 MIN: ICD-10-PCS | Mod: S$GLB,,, | Performed by: FAMILY MEDICINE

## 2022-07-19 PROCEDURE — 99999 PR PBB SHADOW E&M-EST. PATIENT-LVL IV: ICD-10-PCS | Mod: PBBFAC,,, | Performed by: FAMILY MEDICINE

## 2022-07-19 RX ORDER — SEMAGLUTIDE 1.34 MG/ML
0.5 INJECTION, SOLUTION SUBCUTANEOUS
Qty: 3 PEN | Refills: 3 | Status: SHIPPED | OUTPATIENT
Start: 2022-07-19 | End: 2022-10-20

## 2022-07-19 NOTE — PROGRESS NOTES
Subjective:   Patient ID: Andrade Barahona is a 52 y.o. male.  Chief Complaint:  Follow-up    Presents for 6 week follow-up    Last visit May 2022  Continue metformin 500 mg twice a day  Started Ozempic 0.25 mg weekly injections  Continue Mevacor 40 mg daily, Zetia 10 mg daily, and Vascepa 1 g 2 tablets twice a day    Reports compliance.  Denies side effects.  Denies symptoms hypo hyperglycemia.  Denies chest pain or claudication.    Reports fasting glucose is in the morning at home 170s to 180s  No readings greater than 100 since adding Ozempic  A1c done prior to visit improved from greater than 11% to 8.8 present heart only 6 weeks treatment  4+ lb weight loss since last visit  Lipid panel done prior to visit with LDL at goal less than 100. Triglycerides only mildly elevated and persistent/chronic low HDL.  CMP with normal kidney, liver, and electrolytes.  Sodium low but consistent with elevated glucose.    No new complaints or concerns today      Current Outpatient Medications:     allopurinoL (ZYLOPRIM) 300 MG tablet, Take 1 tablet (300 mg total) by mouth once daily., Disp: 90 tablet, Rfl: 3    ezetimibe (ZETIA) 10 mg tablet, Take 1 tablet (10 mg total) by mouth once daily., Disp: 90 tablet, Rfl: 3    icosapent ethyL (VASCEPA) 1 gram Cap, Take 2 capsules (2 g total) by mouth 2 (two) times daily., Disp: 360 capsule, Rfl: 3    lovastatin (MEVACOR) 40 MG tablet, Take 1 tablet (40 mg total) by mouth once daily., Disp: 90 tablet, Rfl: 3    metFORMIN (GLUCOPHAGE-XR) 500 MG ER 24hr tablet, Take 1 tablet (500 mg total) by mouth 2 (two) times daily with meals., Disp: , Rfl:     tadalafiL (CIALIS) 20 MG Tab, TAKE 1 TABLET (20 MG TOTAL) BY MOUTH DAILY AS NEEDED (FOR ERECTILE DYSFUNCTION)., Disp: 6 tablet, Rfl: 59    semaglutide (OZEMPIC) 0.25 mg or 0.5 mg(2 mg/1.5 mL) pen injector, Inject 0.5 mg into the skin every 7 days., Disp: 3 pen, Rfl: 3    valACYclovir (VALTREX) 1000 MG tablet, Take 1 tablet (1,000 mg total) by  "mouth once daily., Disp: 90 tablet, Rfl: 0    Review of Systems   Constitutional: Negative for chills, diaphoresis, fatigue and fever.   Eyes: Negative for visual disturbance.   Respiratory: Negative for cough, chest tightness, shortness of breath and wheezing.    Cardiovascular: Negative for chest pain, palpitations and leg swelling.   Gastrointestinal: Negative for abdominal pain, constipation, diarrhea, nausea and vomiting.   Endocrine: Negative for cold intolerance, heat intolerance, polydipsia, polyphagia and polyuria.   Musculoskeletal: Negative for myalgias.   Skin: Negative for rash.   Neurological: Negative for dizziness, tremors, syncope, weakness, numbness and headaches.   Hematological: Does not bruise/bleed easily.   Psychiatric/Behavioral: The patient is not nervous/anxious.      Objective:   /80 (BP Location: Right arm, Patient Position: Sitting, BP Method: Large (Manual))   Pulse 90   Temp 97.2 °F (36.2 °C) (Tympanic)   Ht 5' 10" (1.778 m)   Wt 118.8 kg (261 lb 14.5 oz)   SpO2 96%   BMI 37.58 kg/m²     Physical Exam  Vitals and nursing note reviewed.   Constitutional:       General: He is not in acute distress.     Appearance: Normal appearance. He is well-developed and normal weight.   Eyes:      General: No scleral icterus.        Right eye: No discharge.         Left eye: No discharge.      Conjunctiva/sclera: Conjunctivae normal.      Right eye: Right conjunctiva is not injected.      Left eye: Left conjunctiva is not injected.   Neck:      Thyroid: No thyroid mass, thyromegaly or thyroid tenderness.      Vascular: No carotid bruit or JVD.   Cardiovascular:      Rate and Rhythm: Normal rate and regular rhythm.      Pulses: Normal pulses.      Heart sounds: Normal heart sounds. No murmur heard.    No friction rub. No gallop.   Pulmonary:      Effort: Pulmonary effort is normal. No respiratory distress.      Breath sounds: Normal breath sounds. No wheezing, rhonchi or rales. "   Abdominal:      General: There is no distension.      Palpations: Abdomen is soft.      Tenderness: There is no abdominal tenderness. There is no guarding or rebound.   Musculoskeletal:      Right lower leg: No edema.      Left lower leg: No edema.   Skin:     Findings: No rash.   Neurological:      Mental Status: He is alert.   Psychiatric:         Attention and Perception: Attention normal.         Mood and Affect: Mood and affect normal.         Cognition and Memory: Cognition normal.         Judgment: Judgment normal.       Assessment:       ICD-10-CM ICD-9-CM   1. Type 2 diabetes mellitus with hyperglycemia, without long-term current use of insulin  E11.65 250.00     790.29   2. Severe obesity (BMI 35.0-39.9) with comorbidity  E66.01 278.01   3. Hyperlipidemia associated with type 2 diabetes mellitus  E11.69 250.80    E78.5 272.4     Plan:   Type 2 diabetes mellitus with hyperglycemia, without long-term current use of insulin  Severe obesity (BMI 35.0-39.9) with comorbidity  -     Hemoglobin A1C; Future; Expected date: 10/19/2022  -     semaglutide (OZEMPIC) 0.25 mg or 0.5 mg(2 mg/1.5 mL) pen injector; Inject 0.5 mg into the skin every 7 days.  Dispense: 3 pen; Refill: 3  Diabetes mellitus control significantly improved  Increase Ozempic 0.5 mg weekly injection  Continue metformin 500 mg twice a day  Check 2 hours postprandial glucose daily  If readings greater than 200, call for additional medication adjustment  Otherwise return to clinic 3 months for repeat A1c  Eye exam, foot exam, and microalbumin stable    Hyperlipidemia associated with type 2 diabetes mellitus  Controlled.  Stable.  Asymptomatic.  LDL at goal.  Continue Mevacor 40 mg daily  Continue Zetia 10 mg daily  Continue Vascepa 1 g 2 capsules twice a day    Return to clinic 3 months or sooner if needed

## 2022-10-14 ENCOUNTER — LAB VISIT (OUTPATIENT)
Dept: LAB | Facility: HOSPITAL | Age: 52
End: 2022-10-14
Attending: FAMILY MEDICINE
Payer: COMMERCIAL

## 2022-10-14 DIAGNOSIS — E66.01 SEVERE OBESITY (BMI 35.0-39.9) WITH COMORBIDITY: ICD-10-CM

## 2022-10-14 DIAGNOSIS — E11.65 TYPE 2 DIABETES MELLITUS WITH HYPERGLYCEMIA, WITHOUT LONG-TERM CURRENT USE OF INSULIN: ICD-10-CM

## 2022-10-14 LAB
ESTIMATED AVG GLUCOSE: 163 MG/DL (ref 68–131)
HBA1C MFR BLD: 7.3 % (ref 4–5.6)

## 2022-10-14 PROCEDURE — 36415 COLL VENOUS BLD VENIPUNCTURE: CPT | Mod: PO | Performed by: FAMILY MEDICINE

## 2022-10-14 PROCEDURE — 83036 HEMOGLOBIN GLYCOSYLATED A1C: CPT | Performed by: FAMILY MEDICINE

## 2022-10-20 ENCOUNTER — OFFICE VISIT (OUTPATIENT)
Dept: INTERNAL MEDICINE | Facility: CLINIC | Age: 52
End: 2022-10-20
Payer: COMMERCIAL

## 2022-10-20 ENCOUNTER — LAB VISIT (OUTPATIENT)
Dept: LAB | Facility: HOSPITAL | Age: 52
End: 2022-10-20
Payer: COMMERCIAL

## 2022-10-20 VITALS
BODY MASS INDEX: 37.37 KG/M2 | HEIGHT: 70 IN | SYSTOLIC BLOOD PRESSURE: 116 MMHG | WEIGHT: 261 LBS | DIASTOLIC BLOOD PRESSURE: 82 MMHG | OXYGEN SATURATION: 97 %

## 2022-10-20 DIAGNOSIS — E11.9 TYPE 2 DIABETES MELLITUS WITHOUT COMPLICATION, WITHOUT LONG-TERM CURRENT USE OF INSULIN: ICD-10-CM

## 2022-10-20 DIAGNOSIS — E78.5 HYPERLIPIDEMIA ASSOCIATED WITH TYPE 2 DIABETES MELLITUS: Chronic | ICD-10-CM

## 2022-10-20 DIAGNOSIS — M1A.09X0 IDIOPATHIC CHRONIC GOUT OF MULTIPLE SITES WITHOUT TOPHUS: Chronic | ICD-10-CM

## 2022-10-20 DIAGNOSIS — B00.1 RECURRENT COLD SORES: Chronic | ICD-10-CM

## 2022-10-20 DIAGNOSIS — E66.01 SEVERE OBESITY (BMI 35.0-39.9) WITH COMORBIDITY: Chronic | ICD-10-CM

## 2022-10-20 DIAGNOSIS — E11.69 HYPERLIPIDEMIA ASSOCIATED WITH TYPE 2 DIABETES MELLITUS: Chronic | ICD-10-CM

## 2022-10-20 DIAGNOSIS — E11.9 TYPE 2 DIABETES MELLITUS WITHOUT COMPLICATION, WITHOUT LONG-TERM CURRENT USE OF INSULIN: Primary | ICD-10-CM

## 2022-10-20 PROBLEM — E11.65 TYPE 2 DIABETES MELLITUS WITH HYPERGLYCEMIA, WITHOUT LONG-TERM CURRENT USE OF INSULIN: Chronic | Status: ACTIVE | Noted: 2020-12-09

## 2022-10-20 PROCEDURE — 99999 PR PBB SHADOW E&M-EST. PATIENT-LVL III: ICD-10-PCS | Mod: PBBFAC,,, | Performed by: FAMILY MEDICINE

## 2022-10-20 PROCEDURE — 82043 UR ALBUMIN QUANTITATIVE: CPT | Performed by: FAMILY MEDICINE

## 2022-10-20 PROCEDURE — 99214 PR OFFICE/OUTPT VISIT, EST, LEVL IV, 30-39 MIN: ICD-10-PCS | Mod: S$GLB,,, | Performed by: FAMILY MEDICINE

## 2022-10-20 PROCEDURE — 99999 PR PBB SHADOW E&M-EST. PATIENT-LVL III: CPT | Mod: PBBFAC,,, | Performed by: FAMILY MEDICINE

## 2022-10-20 PROCEDURE — 99214 OFFICE O/P EST MOD 30 MIN: CPT | Mod: S$GLB,,, | Performed by: FAMILY MEDICINE

## 2022-10-20 PROCEDURE — 82570 ASSAY OF URINE CREATININE: CPT | Performed by: FAMILY MEDICINE

## 2022-10-20 RX ORDER — METFORMIN HYDROCHLORIDE 500 MG/1
500 TABLET, EXTENDED RELEASE ORAL 2 TIMES DAILY WITH MEALS
Qty: 180 TABLET | Refills: 3 | Status: SHIPPED | OUTPATIENT
Start: 2022-10-20 | End: 2023-11-06 | Stop reason: SDUPTHER

## 2022-10-20 RX ORDER — SEMAGLUTIDE 1.34 MG/ML
1 INJECTION, SOLUTION SUBCUTANEOUS
Qty: 3 PEN | Refills: 3 | Status: SHIPPED | OUTPATIENT
Start: 2022-10-20 | End: 2023-03-03

## 2022-10-20 NOTE — PROGRESS NOTES
Subjective:   Patient ID: Andrade Barahona is a 52 y.o. male.  Chief Complaint:  Follow-up    Presents for follow-up on diabetes mellitus    Last visit July 2022  - Type 2 diabetes mellitus with hyperglycemia, without long-term current use of insulin  - Severe obesity (BMI 35.0-39.9) with comorbidity  A1c 8.8%  Diabetes mellitus control significantly improved  Increased Ozempic 0.5 mg weekly injection  A1c is in metformin 500 mg twice a day  Check 2 hours postprandial glucose daily  If readings greater than 200, call for additional medication adjustment  Otherwise return to clinic 3 months for repeat A1c  A1c done prior to today's visit 7.3% and significantly improved   Continues to report compliance with above medication   Denies side effects   Denies symptoms hypo or hyperglycemia.    Microalbumin and foot exam due December 2020 to     - Hyperlipidemia associated with type 2 diabetes mellitus  Controlled.  Stable.  Asymptomatic.  LDL at goal.  Continued Mevacor 40 mg daily  Continued  Zetia 10 mg daily  Continued Vascepa 1 g 2 capsules twice a day    Current Outpatient Medications:     ezetimibe (ZETIA) 10 mg tablet, Take 1 tablet (10 mg total) by mouth once daily., Disp: 90 tablet, Rfl: 3    icosapent ethyL (VASCEPA) 1 gram Cap, Take 2 capsules (2 g total) by mouth 2 (two) times daily., Disp: 360 capsule, Rfl: 3    lovastatin (MEVACOR) 40 MG tablet, Take 1 tablet (40 mg total) by mouth once daily., Disp: 90 tablet, Rfl: 3    tadalafiL (CIALIS) 20 MG Tab, TAKE 1 TABLET (20 MG TOTAL) BY MOUTH DAILY AS NEEDED (FOR ERECTILE DYSFUNCTION)., Disp: 6 tablet, Rfl: 59    metFORMIN (GLUCOPHAGE-XR) 500 MG ER 24hr tablet, Take 1 tablet (500 mg total) by mouth 2 (two) times daily with meals., Disp: 180 tablet, Rfl: 3    semaglutide (OZEMPIC) 1 mg/dose (4 mg/3 mL), Inject 1 mg into the skin every 7 days., Disp: 3 pen, Rfl: 3    valACYclovir (VALTREX) 1000 MG tablet, Take 1 tablet (1,000 mg total) by mouth once daily., Disp: 90  "tablet, Rfl: 0    Review of Systems   Constitutional:  Negative for chills, fatigue and fever.   Eyes:  Negative for visual disturbance.   Respiratory:  Negative for cough, chest tightness, shortness of breath and wheezing.    Cardiovascular:  Negative for chest pain, palpitations and leg swelling.   Gastrointestinal:  Negative for abdominal pain, constipation, diarrhea, nausea and vomiting.   Endocrine: Negative for polydipsia, polyphagia and polyuria.   Musculoskeletal:  Negative for myalgias.   Skin:  Negative for rash.   Neurological:  Negative for dizziness, syncope, weakness, numbness and headaches.     Objective:   /82 (BP Location: Left arm, Patient Position: Sitting, BP Method: Large (Manual))   Ht 5' 10" (1.778 m)   Wt 118.4 kg (261 lb 0.4 oz)   SpO2 97%   BMI 37.45 kg/m²     Physical Exam  Vitals and nursing note reviewed.   Constitutional:       General: He is not in acute distress.     Appearance: Normal appearance. He is well-developed. He is obese.   Eyes:      General: No scleral icterus.        Right eye: No discharge.         Left eye: No discharge.      Conjunctiva/sclera: Conjunctivae normal.   Neck:      Vascular: No JVD.   Cardiovascular:      Rate and Rhythm: Normal rate and regular rhythm.      Pulses:           Dorsalis pedis pulses are 2+ on the right side and 2+ on the left side.        Posterior tibial pulses are 2+ on the right side and 2+ on the left side.      Heart sounds: Normal heart sounds. No murmur heard.    No friction rub. No gallop.   Pulmonary:      Effort: Pulmonary effort is normal.      Breath sounds: Normal breath sounds.   Abdominal:      General: There is no distension.      Palpations: Abdomen is soft.      Tenderness: There is no abdominal tenderness. There is no guarding or rebound.   Musculoskeletal:      Right lower leg: No edema.      Left lower leg: No edema.   Feet:      Right foot:      Protective Sensation: 5 sites tested.  3 sites sensed.      Skin " integrity: No ulcer, blister, skin breakdown, erythema, warmth, callus, dry skin or fissure.      Toenail Condition: Right toenails are normal.      Left foot:      Protective Sensation: 5 sites tested.  3 sites sensed.      Skin integrity: No ulcer, blister, skin breakdown, erythema, warmth, callus, dry skin or fissure.      Toenail Condition: Left toenails are normal.   Skin:     Findings: No rash.   Neurological:      General: No focal deficit present.      Mental Status: He is alert and oriented to person, place, and time.      Gait: Gait is intact.   Psychiatric:         Attention and Perception: Attention normal.         Mood and Affect: Mood and affect normal.         Thought Content: Thought content normal.       Assessment:       ICD-10-CM ICD-9-CM   1. Type 2 diabetes mellitus without complication, without long-term current use of insulin  E11.9 250.00   2. Severe obesity (BMI 35.0-39.9) with comorbidity  E66.01 278.01   3. Hyperlipidemia associated with type 2 diabetes mellitus  E11.69 250.80    E78.5 272.4   4. Idiopathic chronic gout of multiple sites without tophus  M1A.09X0 274.02   5. Recurrent cold sores  B00.1 054.9     Plan:   Type 2 diabetes mellitus without complication, without long-term current use of insulin  Severe obesity (BMI 35.0-39.9) with comorbidity  -     Microalbumin/Creatinine Ratio, Urine; Future; Expected date: 10/20/2022  -     semaglutide (OZEMPIC) 1 mg/dose (4 mg/3 mL); Inject 1 mg into the skin every 7 days.  Dispense: 3 pen; Refill: 3  -     metFORMIN (GLUCOPHAGE-XR) 500 MG ER 24hr tablet; Take 1 tablet (500 mg total) by mouth 2 (two) times daily with meals.  Dispense: 180 tablet; Refill: 3  Controlled.  Stable.  Asymptomatic.  A1c less than 8%.    Continue metformin 500 mg twice a day  Increase Ozempic 1 mg weekly injection to help promote additional weight loss   Eye exam up-to-date   Microalbumin today  Foot exam with mild decreased sensation 1st and 3rd digit, otherwise  normal      Hyperlipidemia associated with type 2 diabetes mellitus  Controlled.  Stable.  Asymptomatic.  LDL at goal.  Continue Mevacor 40 mg daily  Continue Zetia 10 mg daily  Continue Vascepa 1 g 2 capsules twice a day    Idiopathic chronic gout of multiple sites without tophus  No acute/recurrent attack since last visit   Okay to remain off medication     Recurrent cold sores  No acute/recurrent attack since last visit   Continue Valtrex 2000 mg to doses 12 hours apart as needed for any future outbreaks     Routine health maintenance   Declines/defers all recommended vaccines     Return to clinic 6 months for annual physical exam or sooner if needed    I hereby acknowledge that I am relying upon documentation authored by a medical student working under my supervision and further I hereby attest that I have verified the student documentation or findings by personally re-performing the physical exam and medical decision making activities of the Evaluation and Management service to be billed.  Stan Wang

## 2022-10-21 LAB
ALBUMIN/CREAT UR: 4.7 UG/MG (ref 0–30)
CREAT UR-MCNC: 107 MG/DL (ref 23–375)
MICROALBUMIN UR DL<=1MG/L-MCNC: 5 UG/ML

## 2022-11-21 ENCOUNTER — OFFICE VISIT (OUTPATIENT)
Dept: OPHTHALMOLOGY | Facility: CLINIC | Age: 52
End: 2022-11-21
Payer: COMMERCIAL

## 2022-11-21 ENCOUNTER — PATIENT MESSAGE (OUTPATIENT)
Dept: OPHTHALMOLOGY | Facility: CLINIC | Age: 52
End: 2022-11-21
Payer: COMMERCIAL

## 2022-11-21 DIAGNOSIS — H52.7 REFRACTIVE ERRORS: ICD-10-CM

## 2022-11-21 DIAGNOSIS — E11.9 DIABETES MELLITUS TYPE 2 WITHOUT RETINOPATHY: Primary | ICD-10-CM

## 2022-11-21 PROCEDURE — 99999 PR PBB SHADOW E&M-EST. PATIENT-LVL I: ICD-10-PCS | Mod: PBBFAC,,, | Performed by: OPTOMETRIST

## 2022-11-21 PROCEDURE — 92015 PR REFRACTION: ICD-10-PCS | Mod: S$GLB,,, | Performed by: OPTOMETRIST

## 2022-11-21 PROCEDURE — 92014 PR EYE EXAM, EST PATIENT,COMPREHESV: ICD-10-PCS | Mod: S$GLB,,, | Performed by: OPTOMETRIST

## 2022-11-21 PROCEDURE — 92014 COMPRE OPH EXAM EST PT 1/>: CPT | Mod: S$GLB,,, | Performed by: OPTOMETRIST

## 2022-11-21 PROCEDURE — 99999 PR PBB SHADOW E&M-EST. PATIENT-LVL I: CPT | Mod: PBBFAC,,, | Performed by: OPTOMETRIST

## 2022-11-21 PROCEDURE — 92015 DETERMINE REFRACTIVE STATE: CPT | Mod: S$GLB,,, | Performed by: OPTOMETRIST

## 2022-11-21 NOTE — PROGRESS NOTES
HPI     Diabetic Eye Exam     Additional comments: Lab Results       Component                Value               Date                       HGBA1C                   7.3 (H)             10/14/2022                          Comments    Pt c/o problems driving at night.  Is bothered by glare from headlights.          Last edited by Vee Loza MA on 11/21/2022  9:12 AM.            Assessment /Plan     For exam results, see Encounter Report.    Diabetes mellitus type 2 without retinopathy    Refractive errors      No Background Diabetic Retinopathy    Dispense Final Rx for glasses.  RTC 1 year  Discussed above and answered questions.

## 2023-04-15 ENCOUNTER — PATIENT MESSAGE (OUTPATIENT)
Dept: INTERNAL MEDICINE | Facility: CLINIC | Age: 53
End: 2023-04-15
Payer: COMMERCIAL

## 2023-05-03 DIAGNOSIS — E11.9 TYPE 2 DIABETES MELLITUS WITHOUT COMPLICATION: ICD-10-CM

## 2023-05-06 ENCOUNTER — OFFICE VISIT (OUTPATIENT)
Dept: INTERNAL MEDICINE | Facility: CLINIC | Age: 53
End: 2023-05-06
Payer: COMMERCIAL

## 2023-05-06 VITALS
BODY MASS INDEX: 37.05 KG/M2 | HEIGHT: 70 IN | WEIGHT: 258.81 LBS | DIASTOLIC BLOOD PRESSURE: 86 MMHG | HEART RATE: 93 BPM | SYSTOLIC BLOOD PRESSURE: 118 MMHG | TEMPERATURE: 97 F | OXYGEN SATURATION: 98 %

## 2023-05-06 DIAGNOSIS — M1A.09X0 IDIOPATHIC CHRONIC GOUT OF MULTIPLE SITES WITHOUT TOPHUS: ICD-10-CM

## 2023-05-06 DIAGNOSIS — Z12.5 SCREENING FOR PROSTATE CANCER: ICD-10-CM

## 2023-05-06 DIAGNOSIS — B00.1 RECURRENT COLD SORES: ICD-10-CM

## 2023-05-06 DIAGNOSIS — Z00.00 ANNUAL PHYSICAL EXAM: Primary | ICD-10-CM

## 2023-05-06 DIAGNOSIS — M25.512 CHRONIC LEFT SHOULDER PAIN: ICD-10-CM

## 2023-05-06 DIAGNOSIS — E66.01 SEVERE OBESITY (BMI 35.0-39.9) WITH COMORBIDITY: ICD-10-CM

## 2023-05-06 DIAGNOSIS — E78.5 HYPERLIPIDEMIA ASSOCIATED WITH TYPE 2 DIABETES MELLITUS: ICD-10-CM

## 2023-05-06 DIAGNOSIS — G89.29 CHRONIC LEFT SHOULDER PAIN: ICD-10-CM

## 2023-05-06 DIAGNOSIS — E11.9 TYPE 2 DIABETES MELLITUS WITHOUT COMPLICATION, WITHOUT LONG-TERM CURRENT USE OF INSULIN: ICD-10-CM

## 2023-05-06 DIAGNOSIS — E11.69 HYPERLIPIDEMIA ASSOCIATED WITH TYPE 2 DIABETES MELLITUS: ICD-10-CM

## 2023-05-06 PROCEDURE — 99396 PREV VISIT EST AGE 40-64: CPT | Mod: S$GLB,,, | Performed by: FAMILY MEDICINE

## 2023-05-06 PROCEDURE — 3072F LOW RISK FOR RETINOPATHY: CPT | Mod: CPTII,S$GLB,, | Performed by: FAMILY MEDICINE

## 2023-05-06 PROCEDURE — 1159F MED LIST DOCD IN RCRD: CPT | Mod: CPTII,S$GLB,, | Performed by: FAMILY MEDICINE

## 2023-05-06 PROCEDURE — 3008F BODY MASS INDEX DOCD: CPT | Mod: CPTII,S$GLB,, | Performed by: FAMILY MEDICINE

## 2023-05-06 PROCEDURE — 3079F DIAST BP 80-89 MM HG: CPT | Mod: CPTII,S$GLB,, | Performed by: FAMILY MEDICINE

## 2023-05-06 PROCEDURE — 99396 PR PREVENTIVE VISIT,EST,40-64: ICD-10-PCS | Mod: S$GLB,,, | Performed by: FAMILY MEDICINE

## 2023-05-06 PROCEDURE — 1160F RVW MEDS BY RX/DR IN RCRD: CPT | Mod: CPTII,S$GLB,, | Performed by: FAMILY MEDICINE

## 2023-05-06 PROCEDURE — 3008F PR BODY MASS INDEX (BMI) DOCUMENTED: ICD-10-PCS | Mod: CPTII,S$GLB,, | Performed by: FAMILY MEDICINE

## 2023-05-06 PROCEDURE — 3074F SYST BP LT 130 MM HG: CPT | Mod: CPTII,S$GLB,, | Performed by: FAMILY MEDICINE

## 2023-05-06 PROCEDURE — 1160F PR REVIEW ALL MEDS BY PRESCRIBER/CLIN PHARMACIST DOCUMENTED: ICD-10-PCS | Mod: CPTII,S$GLB,, | Performed by: FAMILY MEDICINE

## 2023-05-06 PROCEDURE — 99999 PR PBB SHADOW E&M-EST. PATIENT-LVL IV: CPT | Mod: PBBFAC,,, | Performed by: FAMILY MEDICINE

## 2023-05-06 PROCEDURE — 99999 PR PBB SHADOW E&M-EST. PATIENT-LVL IV: ICD-10-PCS | Mod: PBBFAC,,, | Performed by: FAMILY MEDICINE

## 2023-05-06 PROCEDURE — 3072F PR LOW RISK FOR RETINOPATHY: ICD-10-PCS | Mod: CPTII,S$GLB,, | Performed by: FAMILY MEDICINE

## 2023-05-06 PROCEDURE — 3079F PR MOST RECENT DIASTOLIC BLOOD PRESSURE 80-89 MM HG: ICD-10-PCS | Mod: CPTII,S$GLB,, | Performed by: FAMILY MEDICINE

## 2023-05-06 PROCEDURE — 1159F PR MEDICATION LIST DOCUMENTED IN MEDICAL RECORD: ICD-10-PCS | Mod: CPTII,S$GLB,, | Performed by: FAMILY MEDICINE

## 2023-05-06 PROCEDURE — 3074F PR MOST RECENT SYSTOLIC BLOOD PRESSURE < 130 MM HG: ICD-10-PCS | Mod: CPTII,S$GLB,, | Performed by: FAMILY MEDICINE

## 2023-05-06 RX ORDER — SEMAGLUTIDE 1.34 MG/ML
1 INJECTION, SOLUTION SUBCUTANEOUS
Qty: 9 ML | Refills: 3 | Status: SHIPPED | OUTPATIENT
Start: 2023-05-06 | End: 2023-11-26

## 2023-05-06 RX ORDER — EZETIMIBE 10 MG/1
10 TABLET ORAL DAILY
Qty: 90 TABLET | Refills: 3 | Status: SHIPPED | OUTPATIENT
Start: 2023-05-06 | End: 2024-05-05

## 2023-05-06 NOTE — PROGRESS NOTES
Subjective:   Patient ID: Andrade Barahona is a 52 y.o. male.  Chief Complaint:  Annual Exam    Presents for annual physical exam     Last visit October 2022   A1c 7.3% and diabetes controlled   Microalbumin negative    Medical history for:  - Diabetes mellitus.  Last A1c less than 8%.  On metformin 1000 mg twice a day and Ozempic 1 mg weekly injections.  Reports compliance.  Denies side effects.  Denies symptoms hypo or hyperglycemia.  Eye exam, foot exam, microalbumin up-to-date.  Needs repeat A1c.  - Hyperlipidemia.  Last lipid panel stable.  On Mevacor 40 mg daily and Zetia 10 mg and Vascepa 1 g 2 tablets twice a day.  Reports compliance.  Denies side effects.  Denies chest pain or claudication.  Needs repeat lipid panel.  - Gout.  On allopurinol 300 mg daily.  No recurrent attacks in greater than 12 months.  - Fever blister/cold sores.  Valtrex 1000 mg 2 tablets 12 hours apart as needed for each outbreak  - Erectile dysfunction.  Cialis as needed.  - Family history for Sudden cardiac death in 2 family members father and grandfather.  Unclear etiology.  Questionable heart valve/ structural aided versus coronary artery disease.  Both were asymptomatic.  Previous evaluation by Cardiology. Other than continue medications for hyperlipidemia, no other treatment recommended    Health maintenance:  - Colon cancer screening up-to-date   - Needs prostate cancer screening   - Needs tetanus booster  - Needs COVID booster   - Needs shingles vaccine series   - Needs pneumonia vaccine  - No recent flu vaccine     Complains of chronic left shoulder pain today, otherwise doing well    Review of Systems   Constitutional:  Negative for chills, diaphoresis, fatigue and fever.   HENT:  Negative for congestion, dental problem, ear discharge, ear pain, postnasal drip, rhinorrhea, sinus pressure, sinus pain, sore throat and trouble swallowing.    Eyes:  Negative for pain, redness and visual disturbance.   Respiratory:  Negative for cough,  "chest tightness, shortness of breath and wheezing.    Cardiovascular:  Negative for chest pain, palpitations and leg swelling.   Gastrointestinal:  Negative for abdominal pain, constipation, diarrhea, nausea and vomiting.   Endocrine: Negative for cold intolerance, heat intolerance, polydipsia, polyphagia and polyuria.   Genitourinary:  Negative for difficulty urinating, dysuria, flank pain, frequency, hematuria and urgency.   Musculoskeletal:  Positive for arthralgias. Negative for joint swelling, myalgias, neck pain and neck stiffness.   Skin:  Negative for rash.   Neurological:  Negative for dizziness, tremors, syncope, weakness, light-headedness, numbness and headaches.   Hematological:  Negative for adenopathy. Does not bruise/bleed easily.   Psychiatric/Behavioral:  Negative for sleep disturbance. The patient is not nervous/anxious.      Objective:   /86 (BP Location: Left arm, Patient Position: Sitting, BP Method: Large (Manual))   Pulse 93   Temp 96.8 °F (36 °C) (Tympanic)   Ht 5' 10" (1.778 m)   Wt 117.4 kg (258 lb 13.1 oz)   SpO2 98%   BMI 37.14 kg/m²     Physical Exam  Vitals and nursing note reviewed.   Constitutional:       General: He is not in acute distress.     Appearance: Normal appearance. He is well-developed. He is obese.   HENT:      Right Ear: Hearing, tympanic membrane, ear canal and external ear normal.      Left Ear: Hearing, tympanic membrane, ear canal and external ear normal.      Nose: Nose normal. No mucosal edema, congestion or rhinorrhea.      Right Sinus: No maxillary sinus tenderness or frontal sinus tenderness.      Left Sinus: No maxillary sinus tenderness or frontal sinus tenderness.      Mouth/Throat:      Pharynx: Oropharynx is clear. Uvula midline. No pharyngeal swelling, oropharyngeal exudate or posterior oropharyngeal erythema.   Eyes:      General: No scleral icterus.     Conjunctiva/sclera: Conjunctivae normal.   Neck:      Thyroid: No thyroid mass, " thyromegaly or thyroid tenderness.      Vascular: Normal carotid pulses. No carotid bruit or JVD.   Cardiovascular:      Rate and Rhythm: Normal rate and regular rhythm.      Pulses: Normal pulses.      Heart sounds: Normal heart sounds. No murmur heard.    No friction rub. No gallop.   Pulmonary:      Effort: Pulmonary effort is normal.      Breath sounds: Normal breath sounds and air entry. No wheezing, rhonchi or rales.   Abdominal:      General: There is no distension.      Palpations: Abdomen is soft.      Tenderness: There is no abdominal tenderness. There is no guarding or rebound.   Musculoskeletal:      Left shoulder: No deformity, bony tenderness or crepitus. Normal range of motion. Normal strength.      Right lower leg: No edema.      Left lower leg: No edema.      Comments:   Impingement and crossover testing positive   Apprehension testing positive   Lymphadenopathy:      Cervical: No cervical adenopathy.   Skin:     Findings: No rash.   Neurological:      General: No focal deficit present.      Mental Status: He is alert. Mental status is at baseline.   Psychiatric:         Attention and Perception: Attention normal.         Mood and Affect: Mood and affect normal.     Assessment:       ICD-10-CM ICD-9-CM   1. Annual physical exam  Z00.00 V70.0   2. Screening for prostate cancer  Z12.5 V76.44   3. Type 2 diabetes mellitus without complication, without long-term current use of insulin  E11.9 250.00   4. Severe obesity (BMI 35.0-39.9) with comorbidity  E66.01 278.01   5. Hyperlipidemia associated with type 2 diabetes mellitus  E11.69 250.80    E78.5 272.4   6. Idiopathic chronic gout of multiple sites without tophus  M1A.09X0 274.02   7. Recurrent cold sores  B00.1 054.9   8. Chronic left shoulder pain  M25.512 719.41    G89.29 338.29     Plan:   Annual physical exam  Screening for prostate cancer  -     CBC Without Differential; Future; Expected date: 05/06/2023  -     Comprehensive Metabolic Panel;  Future; Expected date: 05/06/2023  -     Lipid Panel; Future; Expected date: 05/06/2023  -     TSH; Future; Expected date: 05/06/2023  -     Hemoglobin A1C; Future; Expected date: 05/06/2023  -     PSA, Screening; Future; Expected date: 05/06/2023  Blood pressure normal.  BMI 37.  Remainder exam stable.    Check labs.  Treat as indicated.    Colon cancer screening up-to-date  Prostate cancer screening today  Declines all recommended vaccines    Type 2 diabetes mellitus without complication, without long-term current use of insulin  Severe obesity (BMI 35.0-39.9) with comorbidity  -     semaglutide (OZEMPIC) 1 mg/dose (4 mg/3 mL); Inject 1 mg into the skin every 7 days.  Dispense: 9 mL; Refill: 3  Stable.  Asymptomatic.    Continue metformin 1000 mg twice a day  If A1c less than 8%, continue Ozempic 1 mg weekly injection  If A1c greater than 8%, increase Ozempic 2 mg weekly injection     Hyperlipidemia associated with type 2 diabetes mellitus  -     ezetimibe (ZETIA) 10 mg tablet; Take 1 tablet (10 mg total) by mouth once daily.  Dispense: 90 tablet; Refill: 3  Stable.  Asymptomatic.  Previous LDL at goal.    Continue Zetia 10 mg daily  Continue fish oil 2 g twice a day   If LDL at goal less than 100, continue Mevacor 40 mg daily  If LDL greater than 100, will need change to different statin     Idiopathic chronic gout of multiple sites without tophus  Stable.  No recurrences.    Continue allopurinol 3 mg daily     Recurrent cold sores  Stable.  No recent flare-ups.    Continue Valtrex as needed     Chronic left shoulder pain  -     X-Ray Shoulder 2 or More Views Left; Future; Expected date: 05/06/2023  Impingement syndrome versus partial rotator cuff tear  Check x-ray  If no significant abnormality identified, will order MRI   Continue Tylenol and/or NSAIDs as needed for pain     Return to clinic 6 months or sooner if needed

## 2023-05-08 ENCOUNTER — HOSPITAL ENCOUNTER (OUTPATIENT)
Dept: RADIOLOGY | Facility: HOSPITAL | Age: 53
Discharge: HOME | End: 2023-05-08
Attending: FAMILY MEDICINE
Payer: COMMERCIAL

## 2023-05-08 DIAGNOSIS — G89.29 CHRONIC LEFT SHOULDER PAIN: ICD-10-CM

## 2023-05-08 DIAGNOSIS — M25.512 CHRONIC LEFT SHOULDER PAIN: Primary | ICD-10-CM

## 2023-05-08 DIAGNOSIS — G89.29 CHRONIC LEFT SHOULDER PAIN: Primary | ICD-10-CM

## 2023-05-08 DIAGNOSIS — M25.512 CHRONIC LEFT SHOULDER PAIN: ICD-10-CM

## 2023-05-08 PROCEDURE — 73030 X-RAY EXAM OF SHOULDER: CPT | Mod: TC,LT

## 2023-05-08 PROCEDURE — 73030 X-RAY EXAM OF SHOULDER: CPT | Mod: 26,LT,, | Performed by: RADIOLOGY

## 2023-05-08 PROCEDURE — 73030 XR SHOULDER COMPLETE 2 OR MORE VIEWS LEFT: ICD-10-PCS | Mod: 26,LT,, | Performed by: RADIOLOGY

## 2023-05-17 DIAGNOSIS — M25.512 LEFT SHOULDER PAIN, UNSPECIFIED CHRONICITY: Primary | ICD-10-CM

## 2023-05-19 ENCOUNTER — OFFICE VISIT (OUTPATIENT)
Dept: SPORTS MEDICINE | Facility: CLINIC | Age: 53
End: 2023-05-19
Payer: COMMERCIAL

## 2023-05-19 ENCOUNTER — HOSPITAL ENCOUNTER (OUTPATIENT)
Dept: RADIOLOGY | Facility: HOSPITAL | Age: 53
Discharge: HOME | End: 2023-05-19
Attending: ORTHOPAEDIC SURGERY
Payer: COMMERCIAL

## 2023-05-19 VITALS — BODY MASS INDEX: 36.94 KG/M2 | WEIGHT: 258 LBS | HEIGHT: 70 IN

## 2023-05-19 DIAGNOSIS — G89.29 CHRONIC LEFT SHOULDER PAIN: ICD-10-CM

## 2023-05-19 DIAGNOSIS — M75.42 SUBACROMIAL IMPINGEMENT OF LEFT SHOULDER: Primary | ICD-10-CM

## 2023-05-19 DIAGNOSIS — M25.512 CHRONIC LEFT SHOULDER PAIN: ICD-10-CM

## 2023-05-19 DIAGNOSIS — M25.512 LEFT SHOULDER PAIN, UNSPECIFIED CHRONICITY: ICD-10-CM

## 2023-05-19 PROCEDURE — 3051F PR MOST RECENT HEMOGLOBIN A1C LEVEL 7.0 - < 8.0%: ICD-10-PCS | Mod: CPTII,S$GLB,, | Performed by: ORTHOPAEDIC SURGERY

## 2023-05-19 PROCEDURE — 3008F PR BODY MASS INDEX (BMI) DOCUMENTED: ICD-10-PCS | Mod: CPTII,S$GLB,, | Performed by: ORTHOPAEDIC SURGERY

## 2023-05-19 PROCEDURE — 3072F PR LOW RISK FOR RETINOPATHY: ICD-10-PCS | Mod: CPTII,S$GLB,, | Performed by: ORTHOPAEDIC SURGERY

## 2023-05-19 PROCEDURE — 20610 LARGE JOINT ASPIRATION/INJECTION: L SUBACROMIAL BURSA: ICD-10-PCS | Mod: LT,S$GLB,, | Performed by: ORTHOPAEDIC SURGERY

## 2023-05-19 PROCEDURE — 20610 DRAIN/INJ JOINT/BURSA W/O US: CPT | Mod: LT,S$GLB,, | Performed by: ORTHOPAEDIC SURGERY

## 2023-05-19 PROCEDURE — 99999 PR PBB SHADOW E&M-EST. PATIENT-LVL V: CPT | Mod: PBBFAC,,, | Performed by: ORTHOPAEDIC SURGERY

## 2023-05-19 PROCEDURE — 99204 PR OFFICE/OUTPT VISIT, NEW, LEVL IV, 45-59 MIN: ICD-10-PCS | Mod: 25,S$GLB,, | Performed by: ORTHOPAEDIC SURGERY

## 2023-05-19 PROCEDURE — 1159F MED LIST DOCD IN RCRD: CPT | Mod: CPTII,S$GLB,, | Performed by: ORTHOPAEDIC SURGERY

## 2023-05-19 PROCEDURE — 99204 OFFICE O/P NEW MOD 45 MIN: CPT | Mod: 25,S$GLB,, | Performed by: ORTHOPAEDIC SURGERY

## 2023-05-19 PROCEDURE — 73020 X-RAY EXAM OF SHOULDER: CPT | Mod: TC,LT

## 2023-05-19 PROCEDURE — 73020 XR SHOULDER 1 VIEW LEFT: ICD-10-PCS | Mod: 26,LT,, | Performed by: RADIOLOGY

## 2023-05-19 PROCEDURE — 3051F HG A1C>EQUAL 7.0%<8.0%: CPT | Mod: CPTII,S$GLB,, | Performed by: ORTHOPAEDIC SURGERY

## 2023-05-19 PROCEDURE — 3008F BODY MASS INDEX DOCD: CPT | Mod: CPTII,S$GLB,, | Performed by: ORTHOPAEDIC SURGERY

## 2023-05-19 PROCEDURE — 99999 PR PBB SHADOW E&M-EST. PATIENT-LVL V: ICD-10-PCS | Mod: PBBFAC,,, | Performed by: ORTHOPAEDIC SURGERY

## 2023-05-19 PROCEDURE — 1159F PR MEDICATION LIST DOCUMENTED IN MEDICAL RECORD: ICD-10-PCS | Mod: CPTII,S$GLB,, | Performed by: ORTHOPAEDIC SURGERY

## 2023-05-19 PROCEDURE — 3072F LOW RISK FOR RETINOPATHY: CPT | Mod: CPTII,S$GLB,, | Performed by: ORTHOPAEDIC SURGERY

## 2023-05-19 PROCEDURE — 73020 X-RAY EXAM OF SHOULDER: CPT | Mod: 26,LT,, | Performed by: RADIOLOGY

## 2023-05-19 RX ORDER — METHYLPREDNISOLONE ACETATE 80 MG/ML
80 INJECTION, SUSPENSION INTRA-ARTICULAR; INTRALESIONAL; INTRAMUSCULAR; SOFT TISSUE
Status: DISCONTINUED | OUTPATIENT
Start: 2023-05-19 | End: 2023-05-19 | Stop reason: HOSPADM

## 2023-05-19 RX ORDER — NAPROXEN 500 MG/1
500 TABLET ORAL 2 TIMES DAILY
Qty: 60 TABLET | Refills: 2 | Status: SHIPPED | OUTPATIENT
Start: 2023-05-19

## 2023-05-19 RX ADMIN — METHYLPREDNISOLONE ACETATE 80 MG: 80 INJECTION, SUSPENSION INTRA-ARTICULAR; INTRALESIONAL; INTRAMUSCULAR; SOFT TISSUE at 08:05

## 2023-05-19 NOTE — PATIENT INSTRUCTIONS
Assessment:  Andrade Barahona is a RHD 52 y.o. male    with a chief complaint of Pain of the Left Shoulder    Left shoulder subacromial impingement    Encounter Diagnoses   Name Primary?    Chronic left shoulder pain     Subacromial impingement of left shoulder Yes      Plan:  Left shoulder subacromial space 2-2-40 corticosteroid injection performed today in clinic  Referral placed to physical therapy to [facility] in [place]  Prescribed Naproxen 500mg, twice a day as needed; do not take any aleve or ibuprofen with the naproxen  Consider MRI of Left shoulder if symptoms persist    Follow-up: 8 weeks or sooner if there are any problems between now and then.    Leave Review:   Google: Leave Google Review  Healthgrades: Leave Healthgrades Review    After Hours Number: [phone]

## 2023-05-19 NOTE — PROCEDURES
Large Joint Aspiration/Injection: L subacromial bursa    Date/Time: 5/19/2023 8:45 AM  Performed by: Dony Edmondson MD  Authorized by: Dony Edmondson MD     Consent Done?:  Yes (Verbal)  Indications:  Pain and diagnostic evaluation  Site marked: the procedure site was marked    Timeout: prior to procedure the correct patient, procedure, and site was verified      Local anesthesia used?: Yes    Anesthesia:  Local infiltration  Local anesthetic:  Bupivacaine 0.25% without epinephrine, lidocaine 1% without epinephrine and topical anesthetic    Details:  Needle Size:  22 G  Ultrasonic Guidance for needle placement?: No    Approach:  Posterior  Location:  Shoulder  Site:  L subacromial bursa  Medications:  80 mg methylPREDNISolone acetate 80 mg/mL  Patient tolerance:  Patient tolerated the procedure well with no immediate complications     2cc 1% lidocaine plain, 2cc 0.5% marcaine plain, 0.5cc 80mg methylprednisolone    Procedure Note:  We discussed the risk and benefits of injections, including pain, infection, bleeding, damage to adjacent structures, risk of reaction to injection. We discussed the steroid/cortisone injections will not heal the problem but mat help decrease inflammation and help with symptoms. We discussed the risk of repeated injections. The patient expressed understanding and wanted to proceed with the injection. We performed a timeout to verify the proper patient, proper procedure, and the proper site. The injection site was prepared in a sterile fashion. The patient tolerated it well and there were no complication. We did discuss with the patient that steroid injections can cause some increase in blood sugar and blood pressure for up to a week after the injection.

## 2023-05-19 NOTE — PROGRESS NOTES
Patient ID: Andrade Barahona  YOB: 1970  MRN: [id number]    Chief Complaint: Pain of the Left Shoulder    Referred By: Stan Wang MD    History of Present Illness: Andrade Barahona is a RHD 52 y.o. male    with a chief complaint of Pain of the Left Shoulder    Patient reports that the onset of pain was in 10/2022 when he was playing golf and through the follow through on his swing. He reports immediate pain when this occurred. He reports use of ice and ibuprofen as need for pain since the initial injury. He states that the pain is a intermittent sharp pain that shoots down his arm. His pain is aggravated by overhead movement and reaching behind.     HPI    Past Medical History:   Past Medical History:   Diagnosis Date    Diabetes mellitus, type 2     Hyperlipidemia associated with type 2 diabetes mellitus 12/9/2020    Idiopathic chronic gout of multiple sites without tophus 12/9/2020    Recurrent cold sores 12/9/2020    Severe obesity (BMI 35.0-39.9) with comorbidity 12/9/2020     Past Surgical History:   Procedure Laterality Date    APPENDECTOMY      KNEE SURGERY       Family History   Problem Relation Age of Onset    Heart disease Father      Social History     Socioeconomic History    Marital status:    Tobacco Use    Smoking status: Never    Smokeless tobacco: Never   Substance and Sexual Activity    Alcohol use: Yes    Drug use: Never    Sexual activity: Yes     Partners: Female     Medication List with Changes/Refills   New Medications    NAPROXEN (NAPROSYN) 500 MG TABLET    Take 1 tablet (500 mg total) by mouth 2 (two) times daily.   Current Medications    ALLOPURINOL (ZYLOPRIM) 300 MG TABLET    TAKE 1 TABLET BY MOUTH EVERY DAY    BLOOD SUGAR DIAGNOSTIC STRP    To check BG two times daily, to use with insurance preferred meter    BLOOD-GLUCOSE METER KIT    To check BG two times daily, to use with insurance preferred meter    EZETIMIBE (ZETIA) 10 MG TABLET    Take 1  tablet (10 mg total) by mouth once daily.    ICOSAPENT ETHYL (VASCEPA) 1 GRAM CAP    TAKE 2 CAPSULES BY MOUTH 2 TIMES DAILY.    LANCETS MISC    To check BG two times daily, to use with insurance preferred meter    LOVASTATIN (MEVACOR) 40 MG TABLET    TAKE 1 TABLET BY MOUTH EVERY DAY    METFORMIN (GLUCOPHAGE-XR) 500 MG ER 24HR TABLET    Take 1 tablet (500 mg total) by mouth 2 (two) times daily with meals.    SEMAGLUTIDE (OZEMPIC) 1 MG/DOSE (4 MG/3 ML)    Inject 1 mg into the skin every 7 days.    TADALAFIL (CIALIS) 20 MG TAB    TAKE 1 TABLET (20 MG TOTAL) BY MOUTH DAILY AS NEEDED (FOR ERECTILE DYSFUNCTION).    VALACYCLOVIR (VALTREX) 1000 MG TABLET    Take 1 tablet (1,000 mg total) by mouth once daily.     Review of patient's allergies indicates:  No Known Allergies  ROS    Physical Exam:   Body mass index is 37.02 kg/m².  There were no vitals filed for this visit.   GENERAL: Well appearing, appropriate for stated age, no acute distress.  CARDIOVASCULAR: Pulses regular by peripheral palpation.  PULMONARY: Respirations are even and non-labored.  NEURO: Awake, alert, and oriented x 3.  PSYCH: Mood & affect are appropriate.  HEENT: Head is normocephalic and atraumatic.  Ortho/SPM Exam  Left Shoulder:    Inspection: no swelling, ecchymosis, erythema    Palpation tenderness: Lateral Subacromial space , Anterior subacromial Space, and Bicipital Groove    Range of motion: 170 deg Flexion         60 deg External Rotation in Adduction         IR to L3    Strength:  5/5 Abduction    5/5 External Rotation in Adduction    5/5 Internal Rotation     N/V Exam:  Radial: Normal motor (EPL/thumbs up)              Normal sensory (dorsal hand)   Median: Normal motor (FPL/A-OK)      Normal sensory (thumb)   Ulnar:  Normal motor (Interossei/scissors-spread)     Normal sensory (5th finger)   LABC: Normal sensory (lateral forearm)   MABC: Normal sensory (medial forearm)   MC: Normal motor (elbow flexion)   Axillary: Normal motor/sensory  (deltoid)  Normal radial and ulnar pulses, warm and well perfused with capillary refill < 2 sec     Empty Can (-)  Speeds (-)  Obriens (+)  Resisted Throwers (-)  Belly Press (-)  Neers (+)  Edmund Warren (+)        Imaging:    X-Ray Shoulder Left 1 View  Narrative: EXAMINATION:  XR SHOULDER 1 VIEW LEFT    CLINICAL HISTORY:  Pain in left shoulder    TECHNIQUE:  Single axillary view of the left shoulder comparison is made to May 8, 2023.    COMPARISON:  None available.    FINDINGS:  No acute fracture.  Glenohumeral relationship is maintained.  Impression: No acute finding.    Electronically signed by: Neo Salcedo  Date:    05/19/2023  Time:    08:40  Narrative & Impression  EXAMINATION:  XR SHOULDER COMPLETE 2 OR MORE VIEWS LEFT     CLINICAL HISTORY:  Pain in left shoulder     TECHNIQUE:  Two or three views of the left shoulder were preformed.     COMPARISON:  None     FINDINGS:  No acute fracture or dislocation.  Mild-to-moderate AC joint arthropathy noted.  No significant degenerative change at the glenohumeral joint.     Impression:     1.  As above        Electronically signed by: Randell Hoskins DO  Date:                                            05/08/2023  Time:                                           09:37    Relevant imaging results reviewed and interpreted by me, discussed with the patient and / or family today.     Other Tests:         Patient Instructions   Assessment:  Andrade Barahona is a RHD 52 y.o. male    with a chief complaint of Pain of the Left Shoulder    Left shoulder subacromial impingement    Encounter Diagnoses   Name Primary?    Chronic left shoulder pain     Subacromial impingement of left shoulder Yes      Plan:  Left shoulder subacromial space 2-2-40 corticosteroid injection performed today in clinic  Referral placed to physical therapy to [facility] in [place]  Prescribed Naproxen 500mg, twice a day as needed; do not take any aleve or ibuprofen with the  naproxen  Consider MRI of Left shoulder if symptoms persist    Follow-up: 8 weeks or sooner if there are any problems between now and then.    Leave Review:   Google: Leave Google Review  Healthgrades: Leave Healthgrades Review    After Hours Number: [phone]      Provider Note/Medical Decision Making: consult by Dr. Wang. Note/plan sent to him.    I discussed worrisome and red flag signs and symptoms with the patient. The patient expressed understanding and agreed to alert me immediately or to go to the emergency room if they experience any of these.   Treatment plan was developed with input from the patient/family, and they expressed understanding and agreement with the plan. All questions were answered today.          Dony Edmondson MD  Orthopaedic Surgery & Sports Medicine       Disclaimer: This note was prepared using a voice recognition system and is likely to have sound alike errors within the text.     I, Pratibha Welch, acted as a scribe for Dony Edmondson MD for the duration of this office visit.

## 2023-06-06 RX ORDER — VALACYCLOVIR HYDROCHLORIDE 1 G/1
1000 TABLET, FILM COATED ORAL DAILY
Qty: 90 TABLET | Refills: 0 | Status: SHIPPED | OUTPATIENT
Start: 2023-06-06 | End: 2023-09-14 | Stop reason: SDUPTHER

## 2023-06-06 NOTE — TELEPHONE ENCOUNTER
Care Due:                  Date            Visit Type   Department     Provider  --------------------------------------------------------------------------------                                EP -                              PRIMARY      [facility] INTER[facility]  Last Visit: 05-      CARE ([facility])   MEDICINE       Stan Wang                              EP -                              PRIMARY      [facility] INTER[facility]  Next Visit: 11-      CARE ([facility])   MEDICINE       Stan Wang                                                            Last  Test          Frequency    Reason                     Performed    Due Date  --------------------------------------------------------------------------------    Uric Acid...  12 months..  allopurinoL..............  12- 12-    [facility] Embedded Care Due Messages. Reference number: [id number].   6/05/2023 11:01:46 PM CDT

## 2023-06-22 ENCOUNTER — TELEPHONE (OUTPATIENT)
Dept: SPORTS MEDICINE | Facility: CLINIC | Age: 53
End: 2023-06-22
Payer: COMMERCIAL

## 2023-06-22 NOTE — TELEPHONE ENCOUNTER
Called patient and left a voicemail to reschedule appointment for week of July 24-28. Gave phone number to call to reschedule.

## 2023-06-26 ENCOUNTER — PATIENT MESSAGE (OUTPATIENT)
Dept: SPORTS MEDICINE | Facility: CLINIC | Age: 53
End: 2023-06-26
Payer: COMMERCIAL

## 2023-07-12 ENCOUNTER — TELEPHONE (OUTPATIENT)
Dept: SPORTS MEDICINE | Facility: CLINIC | Age: 53
End: 2023-07-12
Payer: COMMERCIAL

## 2023-07-13 ENCOUNTER — PATIENT MESSAGE (OUTPATIENT)
Dept: SPORTS MEDICINE | Facility: CLINIC | Age: 53
End: 2023-07-13
Payer: COMMERCIAL

## 2023-07-13 ENCOUNTER — TELEPHONE (OUTPATIENT)
Dept: SPORTS MEDICINE | Facility: CLINIC | Age: 53
End: 2023-07-13
Payer: COMMERCIAL

## 2023-07-13 NOTE — TELEPHONE ENCOUNTER
Called patient and left voicemail to reschedule appointment. Left phone number to call back or offered to reschedule her through [employer].

## 2023-07-24 ENCOUNTER — PATIENT MESSAGE (OUTPATIENT)
Dept: INTERNAL MEDICINE | Facility: CLINIC | Age: 53
End: 2023-07-24
Payer: COMMERCIAL

## 2023-07-25 DIAGNOSIS — N52.9 ERECTILE DYSFUNCTION, UNSPECIFIED ERECTILE DYSFUNCTION TYPE: Primary | ICD-10-CM

## 2023-07-28 ENCOUNTER — LAB VISIT (OUTPATIENT)
Dept: LAB | Facility: HOSPITAL | Age: 53
End: 2023-07-28
Attending: FAMILY MEDICINE
Payer: COMMERCIAL

## 2023-07-28 DIAGNOSIS — N52.9 ERECTILE DYSFUNCTION, UNSPECIFIED ERECTILE DYSFUNCTION TYPE: ICD-10-CM

## 2023-07-28 PROCEDURE — 84270 ASSAY OF SEX HORMONE GLOBUL: CPT | Performed by: FAMILY MEDICINE

## 2023-07-28 PROCEDURE — 84403 ASSAY OF TOTAL TESTOSTERONE: CPT | Performed by: FAMILY MEDICINE

## 2023-07-28 PROCEDURE — 36415 COLL VENOUS BLD VENIPUNCTURE: CPT | Performed by: FAMILY MEDICINE

## 2023-08-04 LAB
ALBUMIN SERPL-MCNC: 4.3 G/DL (ref 3.6–5.1)
SHBG SERPL-SCNC: 9 NMOL/L (ref 10–50)
TESTOST FREE SERPL-MCNC: 53.3 PG/ML (ref 46–224)
TESTOST SERPL-MCNC: 186 NG/DL (ref 250–1100)
TESTOSTERONE.FREE+WB SERPL-MCNC: 105 NG/DL (ref 110–575)

## 2023-08-11 ENCOUNTER — OFFICE VISIT (OUTPATIENT)
Dept: SPORTS MEDICINE | Facility: CLINIC | Age: 53
End: 2023-08-11
Payer: COMMERCIAL

## 2023-08-11 VITALS — BODY MASS INDEX: 35.65 KG/M2 | WEIGHT: 249 LBS | HEIGHT: 70 IN

## 2023-08-11 DIAGNOSIS — M25.512 ACUTE PAIN OF LEFT SHOULDER: Primary | ICD-10-CM

## 2023-08-11 PROCEDURE — 3008F BODY MASS INDEX DOCD: CPT | Mod: CPTII,S$GLB,, | Performed by: ORTHOPAEDIC SURGERY

## 2023-08-11 PROCEDURE — 3051F HG A1C>EQUAL 7.0%<8.0%: CPT | Mod: CPTII,S$GLB,, | Performed by: ORTHOPAEDIC SURGERY

## 2023-08-11 PROCEDURE — 3072F LOW RISK FOR RETINOPATHY: CPT | Mod: CPTII,S$GLB,, | Performed by: ORTHOPAEDIC SURGERY

## 2023-08-11 PROCEDURE — 3072F PR LOW RISK FOR RETINOPATHY: ICD-10-PCS | Mod: CPTII,S$GLB,, | Performed by: ORTHOPAEDIC SURGERY

## 2023-08-11 PROCEDURE — 1159F PR MEDICATION LIST DOCUMENTED IN MEDICAL RECORD: ICD-10-PCS | Mod: CPTII,S$GLB,, | Performed by: ORTHOPAEDIC SURGERY

## 2023-08-11 PROCEDURE — 99999 PR PBB SHADOW E&M-EST. PATIENT-LVL III: ICD-10-PCS | Mod: PBBFAC,,, | Performed by: ORTHOPAEDIC SURGERY

## 2023-08-11 PROCEDURE — 3008F PR BODY MASS INDEX (BMI) DOCUMENTED: ICD-10-PCS | Mod: CPTII,S$GLB,, | Performed by: ORTHOPAEDIC SURGERY

## 2023-08-11 PROCEDURE — 99212 PR OFFICE/OUTPT VISIT, EST, LEVL II, 10-19 MIN: ICD-10-PCS | Mod: S$GLB,,, | Performed by: ORTHOPAEDIC SURGERY

## 2023-08-11 PROCEDURE — 99999 PR PBB SHADOW E&M-EST. PATIENT-LVL III: CPT | Mod: PBBFAC,,, | Performed by: ORTHOPAEDIC SURGERY

## 2023-08-11 PROCEDURE — 3051F PR MOST RECENT HEMOGLOBIN A1C LEVEL 7.0 - < 8.0%: ICD-10-PCS | Mod: CPTII,S$GLB,, | Performed by: ORTHOPAEDIC SURGERY

## 2023-08-11 PROCEDURE — 1159F MED LIST DOCD IN RCRD: CPT | Mod: CPTII,S$GLB,, | Performed by: ORTHOPAEDIC SURGERY

## 2023-08-11 PROCEDURE — 99212 OFFICE O/P EST SF 10 MIN: CPT | Mod: S$GLB,,, | Performed by: ORTHOPAEDIC SURGERY

## 2023-08-11 NOTE — PROGRESS NOTES
Patient ID: Andrade Barahona  YOB: 1970  MRN: [id number]    Chief Complaint: Pain of the Left Shoulder (Pt states that he did the pt and it gave him some relief  but the pain is still there.)    Referred By: Stan Wang MD    History of Present Illness: Andrade Barahona is a RHD 53 y.o. male    with a chief complaint of Pain of the Left Shoulder (Pt states that he did the pt and it gave him some relief  but the pain is still there.)  Andrade presents to the clinic today for L shoulder pain fu. He rates his pain as a 5 out of 10 today. He states he has been having continued pain. He has continued PT at [facility] and states it has helped improve his ROM. He reports the steroid injection did not cause any pain relief.       Recall from visit on 5/19/23:  Patient reports that the onset of pain was in 10/2022 when he was playing golf and through the follow through on his swing. He reports immediate pain when this occurred. He reports use of ice and ibuprofen as need for pain since the initial injury. He states that the pain is a intermittent sharp pain that shoots down his arm. His pain is aggravated by overhead movement and reaching behind.     HPI    Past Medical History:   Past Medical History:   Diagnosis Date    Diabetes mellitus, type 2     Hyperlipidemia associated with type 2 diabetes mellitus 12/9/2020    Idiopathic chronic gout of multiple sites without tophus 12/9/2020    Recurrent cold sores 12/9/2020    Severe obesity (BMI 35.0-39.9) with comorbidity 12/9/2020     Past Surgical History:   Procedure Laterality Date    APPENDECTOMY      KNEE SURGERY       Family History   Problem Relation Age of Onset    Heart disease Father      Social History     Socioeconomic History    Marital status:    Tobacco Use    Smoking status: Never    Smokeless tobacco: Never   Substance and Sexual Activity    Alcohol use: Yes    Drug use: Never    Sexual activity: Yes     Partners: Female      Medication List with Changes/Refills   Current Medications    ALLOPURINOL (ZYLOPRIM) 300 MG TABLET    TAKE 1 TABLET BY MOUTH EVERY DAY    BLOOD SUGAR DIAGNOSTIC STRP    To check BG two times daily, to use with insurance preferred meter    BLOOD-GLUCOSE METER KIT    To check BG two times daily, to use with insurance preferred meter    EZETIMIBE (ZETIA) 10 MG TABLET    Take 1 tablet (10 mg total) by mouth once daily.    ICOSAPENT ETHYL (VASCEPA) 1 GRAM CAP    TAKE 2 CAPSULES BY MOUTH 2 TIMES DAILY.    LANCETS MISC    To check BG two times daily, to use with insurance preferred meter    LOVASTATIN (MEVACOR) 40 MG TABLET    TAKE 1 TABLET BY MOUTH EVERY DAY    METFORMIN (GLUCOPHAGE-XR) 500 MG ER 24HR TABLET    Take 1 tablet (500 mg total) by mouth 2 (two) times daily with meals.    NAPROXEN (NAPROSYN) 500 MG TABLET    Take 1 tablet (500 mg total) by mouth 2 (two) times daily.    SEMAGLUTIDE (OZEMPIC) 1 MG/DOSE (4 MG/3 ML)    Inject 1 mg into the skin every 7 days.    TADALAFIL (CIALIS) 20 MG TAB    TAKE 1 TABLET (20 MG TOTAL) BY MOUTH DAILY AS NEEDED (FOR ERECTILE DYSFUNCTION).    VALACYCLOVIR (VALTREX) 1000 MG TABLET    Take 1 tablet (1,000 mg total) by mouth once daily.     Review of patient's allergies indicates:  No Known Allergies  ROS    Physical Exam:   Body mass index is 35.73 kg/m².  There were no vitals filed for this visit.   GENERAL: Well appearing, appropriate for stated age, no acute distress.  CARDIOVASCULAR: Pulses regular by peripheral palpation.  PULMONARY: Respirations are even and non-labored.  NEURO: Awake, alert, and oriented x 3.  PSYCH: Mood & affect are appropriate.  HEENT: Head is normocephalic and atraumatic.  Ortho/SPM Exam      Imaging:    X-Ray Shoulder Left 1 View  Narrative: EXAMINATION:  XR SHOULDER 1 VIEW LEFT    CLINICAL HISTORY:  Pain in left shoulder    TECHNIQUE:  Single axillary view of the left shoulder comparison is made to May 8, 2023.    COMPARISON:  None  available.    FINDINGS:  No acute fracture.  Glenohumeral relationship is maintained.  Impression: No acute finding.    Electronically signed by: Neo Salcedo  Date:    05/19/2023  Time:    08:40      Relevant imaging results reviewed and interpreted by me, discussed with the patient and / or family today.     Other Tests:         There are no Patient Instructions on file for this visit.  Provider Note/Medical Decision Making:       I discussed worrisome and red flag signs and symptoms with the patient. The patient expressed understanding and agreed to alert me immediately or to go to the emergency room if they experience any of these.   Treatment plan was developed with input from the patient/family, and they expressed understanding and agreement with the plan. All questions were answered today.          Dony Edmondson MD  Orthopaedic Surgery & Sports Medicine       Disclaimer: This note was prepared using a voice recognition system and is likely to have sound alike errors within the text.

## 2023-08-26 ENCOUNTER — HOSPITAL ENCOUNTER (OUTPATIENT)
Dept: RADIOLOGY | Facility: HOSPITAL | Age: 53
Discharge: HOME | End: 2023-08-26
Attending: ORTHOPAEDIC SURGERY
Payer: COMMERCIAL

## 2023-08-26 DIAGNOSIS — M25.512 ACUTE PAIN OF LEFT SHOULDER: ICD-10-CM

## 2023-08-26 PROCEDURE — 73221 MRI JOINT UPR EXTREM W/O DYE: CPT | Mod: 26,LT,, | Performed by: RADIOLOGY

## 2023-08-26 PROCEDURE — 73221 MRI SHOULDER WITHOUT CONTRAST LEFT: ICD-10-PCS | Mod: 26,LT,, | Performed by: RADIOLOGY

## 2023-08-26 PROCEDURE — 73221 MRI JOINT UPR EXTREM W/O DYE: CPT | Mod: TC,LT

## 2023-09-07 NOTE — LETTER
December 16, 2020      Stan Wang MD  [address]  [place] [address]           [facility]  [address]  [place] [address]  Phone: [phone]  Fax: [phone]          Patient: Andrade Barahona   MR Number: [id number]   YOB: 1970   Date of Visit: 12/16/2020       Dear Dr. Stan Wang:    Thank you for referring Andrade Barahona to me for evaluation. Attached you will find relevant portions of my assessment and plan of care.    If you have questions, please do not hesitate to call me. I look forward to following Andrade Barahona along with you.    Sincerely,    Ricky Patel MD    Enclosure  CC:  No Recipients    If you would like to receive this communication electronically, please contact [email or web address] or [phone] to request more information on [employer] access.    For providers and/or their staff who would like to refer a patient to [facility], please contact us through our one-stop-shop provider referral line, [facility], at [phone].    If you feel you have received this communication in error or would no longer like to receive these types of communications, please e-mail [email or web address]          Patient calling, requesting result interpretation regarding MRI Brain and cervical spine completed last Friday. Please advise.

## 2023-09-14 ENCOUNTER — OFFICE VISIT (OUTPATIENT)
Dept: SPORTS MEDICINE | Facility: CLINIC | Age: 53
End: 2023-09-14
Payer: COMMERCIAL

## 2023-09-14 VITALS — HEIGHT: 70 IN | BODY MASS INDEX: 35.63 KG/M2 | WEIGHT: 248.88 LBS

## 2023-09-14 DIAGNOSIS — M75.42 SUBACROMIAL IMPINGEMENT OF LEFT SHOULDER: ICD-10-CM

## 2023-09-14 DIAGNOSIS — M75.22 BICEPS TENDINITIS OF LEFT UPPER EXTREMITY: Primary | ICD-10-CM

## 2023-09-14 PROCEDURE — 3072F LOW RISK FOR RETINOPATHY: CPT | Mod: CPTII,S$GLB,, | Performed by: ORTHOPAEDIC SURGERY

## 2023-09-14 PROCEDURE — 99999 PR PBB SHADOW E&M-EST. PATIENT-LVL IV: ICD-10-PCS | Mod: PBBFAC,,, | Performed by: ORTHOPAEDIC SURGERY

## 2023-09-14 PROCEDURE — 3008F PR BODY MASS INDEX (BMI) DOCUMENTED: ICD-10-PCS | Mod: CPTII,S$GLB,, | Performed by: ORTHOPAEDIC SURGERY

## 2023-09-14 PROCEDURE — 3072F PR LOW RISK FOR RETINOPATHY: ICD-10-PCS | Mod: CPTII,S$GLB,, | Performed by: ORTHOPAEDIC SURGERY

## 2023-09-14 PROCEDURE — 1159F PR MEDICATION LIST DOCUMENTED IN MEDICAL RECORD: ICD-10-PCS | Mod: CPTII,S$GLB,, | Performed by: ORTHOPAEDIC SURGERY

## 2023-09-14 PROCEDURE — 99999 PR PBB SHADOW E&M-EST. PATIENT-LVL IV: CPT | Mod: PBBFAC,,, | Performed by: ORTHOPAEDIC SURGERY

## 2023-09-14 PROCEDURE — 1159F MED LIST DOCD IN RCRD: CPT | Mod: CPTII,S$GLB,, | Performed by: ORTHOPAEDIC SURGERY

## 2023-09-14 PROCEDURE — 99214 OFFICE O/P EST MOD 30 MIN: CPT | Mod: 25,S$GLB,, | Performed by: ORTHOPAEDIC SURGERY

## 2023-09-14 PROCEDURE — 20550 TENDON SHEATH: ICD-10-PCS | Mod: LT,S$GLB,, | Performed by: ORTHOPAEDIC SURGERY

## 2023-09-14 PROCEDURE — 3051F HG A1C>EQUAL 7.0%<8.0%: CPT | Mod: CPTII,S$GLB,, | Performed by: ORTHOPAEDIC SURGERY

## 2023-09-14 PROCEDURE — 20550 NJX 1 TENDON SHEATH/LIGAMENT: CPT | Mod: LT,S$GLB,, | Performed by: ORTHOPAEDIC SURGERY

## 2023-09-14 PROCEDURE — 3051F PR MOST RECENT HEMOGLOBIN A1C LEVEL 7.0 - < 8.0%: ICD-10-PCS | Mod: CPTII,S$GLB,, | Performed by: ORTHOPAEDIC SURGERY

## 2023-09-14 PROCEDURE — 3008F BODY MASS INDEX DOCD: CPT | Mod: CPTII,S$GLB,, | Performed by: ORTHOPAEDIC SURGERY

## 2023-09-14 PROCEDURE — 76942 TENDON SHEATH: ICD-10-PCS | Mod: S$GLB,,, | Performed by: ORTHOPAEDIC SURGERY

## 2023-09-14 PROCEDURE — 99214 PR OFFICE/OUTPT VISIT, EST, LEVL IV, 30-39 MIN: ICD-10-PCS | Mod: 25,S$GLB,, | Performed by: ORTHOPAEDIC SURGERY

## 2023-09-14 PROCEDURE — 76942 ECHO GUIDE FOR BIOPSY: CPT | Mod: S$GLB,,, | Performed by: ORTHOPAEDIC SURGERY

## 2023-09-14 RX ADMIN — METHYLPREDNISOLONE ACETATE 40 MG: 80 INJECTION, SUSPENSION INTRA-ARTICULAR; INTRALESIONAL; INTRAMUSCULAR; SOFT TISSUE at 02:09

## 2023-09-14 NOTE — PATIENT INSTRUCTIONS
Assessment:  Andrade Barahona is a RHD 53 y.o. male    with a chief complaint of Pain of the Left Shoulder    Left shoulder subacromial impingement  Left bicep tendonitis    Encounter Diagnoses   Name Primary?    Biceps tendinitis of left upper extremity Yes    Subacromial impingement of left shoulder         Plan:  US guided CSI of the left bicep tendon today in clinic. - 2/2/40  Possible US guided Glenohumeral injection in the future    Follow-up: 2 weeks or sooner if there are any problems between now and then.    Leave Review:   Google: Leave Google Review  Healthgrades: Leave Healthgrades Review    After Hours Number: [phone]

## 2023-09-14 NOTE — PROGRESS NOTES
Patient ID: Andrade Barahona  YOB: 1970  MRN: [id number]    Chief Complaint: Pain of the Left Shoulder    Referred By: Stan Wang    History of Present Illness: Andrade Barahona is a RHD 53 y.o. male    with a chief complaint of Pain of the Left Shoulder    Andrade presents to the clinic today with L shoulder pain 5/10 today.  He no longer is going to PT.  He did report some improvement with PT.  Denies neck pain.      HPI 8/11/23:  Andrade presents to the clinic today for L shoulder pain fu. He rates his pain as a 5 out of 10 today. He states he has been having continued pain. He has continued PT at [facility] and states it has helped improve his ROM. He reports the steroid injection did not cause any pain relief.     HPI    Past Medical History:   Past Medical History:   Diagnosis Date    Diabetes mellitus, type 2     Hyperlipidemia associated with type 2 diabetes mellitus 12/9/2020    Idiopathic chronic gout of multiple sites without tophus 12/9/2020    Recurrent cold sores 12/9/2020    Severe obesity (BMI 35.0-39.9) with comorbidity 12/9/2020     Past Surgical History:   Procedure Laterality Date    APPENDECTOMY      KNEE SURGERY       Family History   Problem Relation Age of Onset    Heart disease Father      Social History     Socioeconomic History    Marital status:    Tobacco Use    Smoking status: Never    Smokeless tobacco: Never   Substance and Sexual Activity    Alcohol use: Yes    Drug use: Never    Sexual activity: Yes     Partners: Female     Medication List with Changes/Refills   Current Medications    ALLOPURINOL (ZYLOPRIM) 300 MG TABLET    TAKE 1 TABLET BY MOUTH EVERY DAY    BLOOD SUGAR DIAGNOSTIC STRP    To check BG two times daily, to use with insurance preferred meter    BLOOD-GLUCOSE METER KIT    To check BG two times daily, to use with insurance preferred meter    EZETIMIBE (ZETIA) 10 MG TABLET    Take 1 tablet (10 mg total) by mouth once daily.     ICOSAPENT ETHYL (VASCEPA) 1 GRAM CAP    TAKE 2 CAPSULES BY MOUTH 2 TIMES DAILY.    LANCETS MISC    To check BG two times daily, to use with insurance preferred meter    LOVASTATIN (MEVACOR) 40 MG TABLET    TAKE 1 TABLET BY MOUTH EVERY DAY    METFORMIN (GLUCOPHAGE-XR) 500 MG ER 24HR TABLET    Take 1 tablet (500 mg total) by mouth 2 (two) times daily with meals.    NAPROXEN (NAPROSYN) 500 MG TABLET    Take 1 tablet (500 mg total) by mouth 2 (two) times daily.    SEMAGLUTIDE (OZEMPIC) 1 MG/DOSE (4 MG/3 ML)    Inject 1 mg into the skin every 7 days.    TADALAFIL (CIALIS) 20 MG TAB    TAKE 1 TABLET (20 MG TOTAL) BY MOUTH DAILY AS NEEDED (FOR ERECTILE DYSFUNCTION).    VALACYCLOVIR (VALTREX) 1000 MG TABLET    Take 1 tablet (1,000 mg total) by mouth once daily.     Review of patient's allergies indicates:  No Known Allergies  ROS    Physical Exam:   Body mass index is 35.71 kg/m².  There were no vitals filed for this visit.   GENERAL: Well appearing, appropriate for stated age, no acute distress.  CARDIOVASCULAR: Pulses regular by peripheral palpation.  PULMONARY: Respirations are even and non-labored.  NEURO: Awake, alert, and oriented x 3.  PSYCH: Mood & affect are appropriate.  HEENT: Head is normocephalic and atraumatic.  Ortho/SPM Exam      Left Shoulder:    Inspection: Normal  Palpation tenderness: Bicipital groove, lateral shoulder  Range of motion: 180 deg Flexion Pain at end range         70 deg External Rotation in abduction  Strength:  5/5 Abduction    5/5 External Rotation in Adduction    5/5 Internal Rotation   Special Tests: Negative Shaffer-Warren    Negative Neer's    Negative Speed's    Negative Pain with AB/ER    N/V Exam:  Radial: Normal motor (EPL/thumbs up)              Normal sensory (dorsal hand)   Median: Normal motor (FPL/A-OK)      Normal sensory (thumb)   Ulnar:  Normal motor (Interossei/scissors-spread)     Normal sensory (5th finger)   LABC: Normal sensory (lateral forearm)   MABC: Normal  sensory (medial forearm)   MC: Normal motor (elbow flexion)   Axillary: Normal motor/sensory (deltoid)  Normal radial and ulnar pulses, warm and well perfused with capillary refill < 2 sec          Imaging:    MRI Shoulder Without Contrast Left  Narrative: EXAM:  MRI SHOULDER WITHOUT CONTRAST LEFT    CLINICAL HISTORY: Left shoulder pain. Shoulder pain, rotator cuff disorder suspected, xray done; [M25.512]-Pain in left shoulder.    TECHNIQUE: Standard multiplanar, multisequence MR imaging protocol of the left shoulder was performed.    FINDINGS: Patient motion degrades image quality.    ROTATOR CUFF: Mild supraspinatus and subscapularis tendinosis.  Minor articular surface fraying of the anterior supraspinatus tendon near the critical zone. No full-thickness or significant partial thickness rotator cuff tear identified.    MUSCULATURE: Within normal limits.    BURSITIS: No significant bursitis.    LONG HEAD BICEPS TENDON: Intact and normal in position.    LABRUM and GLENOHUMERAL LIGAMENTS: No discernible tears.    MARROW: No fractures, marrow edema or suspicious bone lesions.    GLENOHUMERAL JOINT: No full-thickness chondral defects.  Normal alignment.    AC JOINT: Minor osteoarthritis with mild subchondral cystlike change, minimal capsular hypertrophy and tiny marginal osteophytes.  Normal alignment.    ACROMIAL ARCH: Type 1 acromion with lateral downsloping.  No os acromiale.  Impression: 1.  Mild supraspinatus and subscapularis tendinosis.  Minor articular surface fraying of the supraspinatus tendon.  No significant rotator cuff tear identified.  2.  Minor acromioclavicular osteoarthritis.  Downsloping acromion.    Finalized on: 8/28/2023 10:04 AM By:  Vinnie Hastings MD  BRRG# [id number]      2023-08-28 10:06:16.561    [facility]      Relevant imaging results reviewed and interpreted by me, discussed with the patient and / or family today.     Other Tests:         Patient Instructions   Assessment:  Andrade Barahona is a RHD 53  y.o. male    with a chief complaint of Pain of the Left Shoulder    Left shoulder subacromial impingement  Left bicep tendonitis    Encounter Diagnoses   Name Primary?    Biceps tendinitis of left upper extremity Yes    Subacromial impingement of left shoulder         Plan:  US guided CSI of the left bicep tendon today in clinic. - 2/2/40  Possible US guided Glenohumeral injection in the future    Follow-up: 2 weeks or sooner if there are any problems between now and then.    Leave Review:   Google: Leave Google Review  Healthgrades: Leave Healthgrades Review    After Hours Number: [phone]      Provider Note/Medical Decision Making:       I discussed worrisome and red flag signs and symptoms with the patient. The patient expressed understanding and agreed to alert me immediately or to go to the emergency room if they experience any of these.   Treatment plan was developed with input from the patient/family, and they expressed understanding and agreement with the plan. All questions were answered today.          Dony Edmondson MD  Orthopaedic Surgery & Sports Medicine       Disclaimer: This note was prepared using a voice recognition system and is likely to have sound alike errors within the text.     I, Estefani Thomas, acted as a scribe for Dony Edmondson MD for the duration of this office visit.

## 2023-09-17 ENCOUNTER — PATIENT MESSAGE (OUTPATIENT)
Dept: SPORTS MEDICINE | Facility: CLINIC | Age: 53
End: 2023-09-17
Payer: COMMERCIAL

## 2023-09-18 RX ORDER — METHYLPREDNISOLONE ACETATE 80 MG/ML
40 INJECTION, SUSPENSION INTRA-ARTICULAR; INTRALESIONAL; INTRAMUSCULAR; SOFT TISSUE
Status: DISCONTINUED | OUTPATIENT
Start: 2023-09-14 | End: 2023-09-18 | Stop reason: HOSPADM

## 2023-09-18 NOTE — PROCEDURES
Tendon Sheath    Date/Time: 9/14/2023 2:45 PM    Performed by: Dony Edmondson MD  Authorized by: Dony Edmondson MD    Consent Done?:  Yes (Verbal)  Indications:  Pain  Site marked: the procedure site was marked    Timeout: prior to procedure the correct patient, procedure, and site was verified    Prep: patient was prepped and draped in usual sterile fashion      Local anesthesia used?: Yes    Anesthesia:  Local infiltration  Local anesthetic:  Lidocaine 1% without epinephrine, topical anesthetic and bupivacaine 0.5% without epinephrine  Anesthetic total (ml):  4    Location:  Shoulder (Left Biceps Tendon Sheath)  Site:  L bicep tendon  Ultrasonic guidance for needle placement?: Yes    Needle size:  20 G  Medications:  40 mg methylPREDNISolone acetate 80 mg/mL  Patient tolerance:  Patient tolerated the procedure well with no immediate complications    Additional Comments: Ultrasound Guided Left Shoulder Biceps Tendon Sheath Injection   2cc 1% lidocaine plain, 2cc 0.5% marcaine plain, 0.5cc 80mg methylprednisolone    Procedure Note:  We discussed the risk and benefits of injections, including pain, infection, bleeding, damage to adjacent structures, risk of reaction to injection. We discussed the steroid/cortisone injections will not heal the problem but mat help decrease inflammation and help with symptoms. We discussed the risk of repeated injections. The patient expressed understanding and wanted to proceed with the injection. We performed a timeout to verify the proper patient, proper procedure, and the proper site. The injection site was prepared in a sterile fashion. The patient tolerated it well and there were no complication. We did discuss with the patient that steroid injections can cause some increase in blood sugar and blood pressure for up to a week after the injection.     ULTRASOUND NOTE: Due to the complex nature of the anatomy, and the need to insure the needle was placed precisely at the  desired anatomical location, this procedure was performed with the assistance of ultrasound guidance. Imaging demonstrating needle trajectory and placement was saved on the ultrasound device for documentation purposes.

## 2023-10-09 ENCOUNTER — OFFICE VISIT (OUTPATIENT)
Dept: SPORTS MEDICINE | Facility: CLINIC | Age: 53
End: 2023-10-09
Payer: COMMERCIAL

## 2023-10-09 VITALS — HEIGHT: 70 IN | WEIGHT: 248 LBS | BODY MASS INDEX: 35.5 KG/M2

## 2023-10-09 DIAGNOSIS — M75.42 SUBACROMIAL IMPINGEMENT OF LEFT SHOULDER: ICD-10-CM

## 2023-10-09 DIAGNOSIS — M75.22 BICEPS TENDINITIS OF LEFT UPPER EXTREMITY: Primary | ICD-10-CM

## 2023-10-09 PROCEDURE — 3072F PR LOW RISK FOR RETINOPATHY: ICD-10-PCS | Mod: CPTII,S$GLB,, | Performed by: ORTHOPAEDIC SURGERY

## 2023-10-09 PROCEDURE — 1159F MED LIST DOCD IN RCRD: CPT | Mod: CPTII,S$GLB,, | Performed by: ORTHOPAEDIC SURGERY

## 2023-10-09 PROCEDURE — 3008F PR BODY MASS INDEX (BMI) DOCUMENTED: ICD-10-PCS | Mod: CPTII,S$GLB,, | Performed by: ORTHOPAEDIC SURGERY

## 2023-10-09 PROCEDURE — 99213 PR OFFICE/OUTPT VISIT, EST, LEVL III, 20-29 MIN: ICD-10-PCS | Mod: S$GLB,,, | Performed by: ORTHOPAEDIC SURGERY

## 2023-10-09 PROCEDURE — 3051F HG A1C>EQUAL 7.0%<8.0%: CPT | Mod: CPTII,S$GLB,, | Performed by: ORTHOPAEDIC SURGERY

## 2023-10-09 PROCEDURE — 99999 PR PBB SHADOW E&M-EST. PATIENT-LVL III: CPT | Mod: PBBFAC,,, | Performed by: ORTHOPAEDIC SURGERY

## 2023-10-09 PROCEDURE — 3008F BODY MASS INDEX DOCD: CPT | Mod: CPTII,S$GLB,, | Performed by: ORTHOPAEDIC SURGERY

## 2023-10-09 PROCEDURE — 3072F LOW RISK FOR RETINOPATHY: CPT | Mod: CPTII,S$GLB,, | Performed by: ORTHOPAEDIC SURGERY

## 2023-10-09 PROCEDURE — 1159F PR MEDICATION LIST DOCUMENTED IN MEDICAL RECORD: ICD-10-PCS | Mod: CPTII,S$GLB,, | Performed by: ORTHOPAEDIC SURGERY

## 2023-10-09 PROCEDURE — 99213 OFFICE O/P EST LOW 20 MIN: CPT | Mod: S$GLB,,, | Performed by: ORTHOPAEDIC SURGERY

## 2023-10-09 PROCEDURE — 99999 PR PBB SHADOW E&M-EST. PATIENT-LVL III: ICD-10-PCS | Mod: PBBFAC,,, | Performed by: ORTHOPAEDIC SURGERY

## 2023-10-09 PROCEDURE — 3051F PR MOST RECENT HEMOGLOBIN A1C LEVEL 7.0 - < 8.0%: ICD-10-PCS | Mod: CPTII,S$GLB,, | Performed by: ORTHOPAEDIC SURGERY

## 2023-10-09 NOTE — PROGRESS NOTES
Patient ID: Andrade Barahona  YOB: 1970  MRN: [id number]    Chief Complaint: Pain of the Left Shoulder      Referred By: Stan Wang    History of Present Illness: Andrade Barahona is a RHD 53 y.o. male    with a chief complaint of Pain of the Left Shoulder    Andrade presents to the clinic today for Left shoulder pain s/p CSI 9/14/23. He states he is doing much better and is back to playing golf. He is no longer going to PT.    Previous Visit: 09/14/23  Andrade presents to the clinic today with L shoulder pain 5/10 today.  He no longer is going to PT.  He did report some improvement with PT.  Denies neck pain.      HPI    Past Medical History:   Past Medical History:   Diagnosis Date    Diabetes mellitus, type 2     Hyperlipidemia associated with type 2 diabetes mellitus 12/9/2020    Idiopathic chronic gout of multiple sites without tophus 12/9/2020    Recurrent cold sores 12/9/2020    Severe obesity (BMI 35.0-39.9) with comorbidity 12/9/2020     Past Surgical History:   Procedure Laterality Date    APPENDECTOMY      KNEE SURGERY       Family History   Problem Relation Age of Onset    Heart disease Father      Social History     Socioeconomic History    Marital status:    Tobacco Use    Smoking status: Never    Smokeless tobacco: Never   Substance and Sexual Activity    Alcohol use: Yes    Drug use: Never    Sexual activity: Yes     Partners: Female     Medication List with Changes/Refills   Current Medications    ALLOPURINOL (ZYLOPRIM) 300 MG TABLET    TAKE 1 TABLET BY MOUTH EVERY DAY    BLOOD SUGAR DIAGNOSTIC STRP    To check BG two times daily, to use with insurance preferred meter    BLOOD-GLUCOSE METER KIT    To check BG two times daily, to use with insurance preferred meter    EZETIMIBE (ZETIA) 10 MG TABLET    Take 1 tablet (10 mg total) by mouth once daily.    ICOSAPENT ETHYL (VASCEPA) 1 GRAM CAP    TAKE 2 CAPSULES BY MOUTH 2 TIMES DAILY.    LANCETS MISC    To check BG  two times daily, to use with insurance preferred meter    LOVASTATIN (MEVACOR) 40 MG TABLET    TAKE 1 TABLET BY MOUTH EVERY DAY    METFORMIN (GLUCOPHAGE-XR) 500 MG ER 24HR TABLET    Take 1 tablet (500 mg total) by mouth 2 (two) times daily with meals.    NAPROXEN (NAPROSYN) 500 MG TABLET    Take 1 tablet (500 mg total) by mouth 2 (two) times daily.    SEMAGLUTIDE (OZEMPIC) 1 MG/DOSE (4 MG/3 ML)    Inject 1 mg into the skin every 7 days.    TADALAFIL (CIALIS) 20 MG TAB    TAKE 1 TABLET (20 MG TOTAL) BY MOUTH DAILY AS NEEDED (FOR ERECTILE DYSFUNCTION).    VALACYCLOVIR (VALTREX) 1000 MG TABLET    Take 1 tablet (1,000 mg total) by mouth once daily.     Review of patient's allergies indicates:  No Known Allergies  ROS    Physical Exam:   Body mass index is 35.58 kg/m².  There were no vitals filed for this visit.   GENERAL: Well appearing, appropriate for stated age, no acute distress.  CARDIOVASCULAR: Pulses regular by peripheral palpation.  PULMONARY: Respirations are even and non-labored.  NEURO: Awake, alert, and oriented x 3.  PSYCH: Mood & affect are appropriate.  HEENT: Head is normocephalic and atraumatic.  Ortho/SPM Exam      Left Shoulder:     Inspection: Normal  Palpation tenderness: Bicipital groove, lateral shoulder  Range of motion: 180 deg Flexion Pain at end range                                   70 deg External Rotation in abduction  Strength:            5/5 Abduction                              5/5 External Rotation in Adduction                              5/5 Internal Rotation   Special Tests: Negative Shaffer-Warren                              Negative Neer's                              Negative Speed's                              Negative Pain with AB/ER                         N/V Exam:        Radial: Normal motor (EPL/thumbs up)              Normal sensory (dorsal hand)                Median: Normal motor (FPL/A-OK)                                Normal sensory (thumb)                Ulnar:   Normal motor (Interossei/scissors-spread)                                           Normal sensory (5th finger)                LABC:  Normal sensory (lateral forearm)                MABC: Normal sensory (medial forearm)                MC: Normal motor (elbow flexion)                Axillary: Normal motor/sensory (deltoid)  Normal radial and ulnar pulses, warm and well perfused with capillary refill < 2 sec    Imaging:    MRI Shoulder Without Contrast Left  Narrative: EXAM:  MRI SHOULDER WITHOUT CONTRAST LEFT    CLINICAL HISTORY: Left shoulder pain. Shoulder pain, rotator cuff disorder suspected, xray done; [M25.512]-Pain in left shoulder.    TECHNIQUE: Standard multiplanar, multisequence MR imaging protocol of the left shoulder was performed.    FINDINGS: Patient motion degrades image quality.    ROTATOR CUFF: Mild supraspinatus and subscapularis tendinosis.  Minor articular surface fraying of the anterior supraspinatus tendon near the critical zone. No full-thickness or significant partial thickness rotator cuff tear identified.    MUSCULATURE: Within normal limits.    BURSITIS: No significant bursitis.    LONG HEAD BICEPS TENDON: Intact and normal in position.    LABRUM and GLENOHUMERAL LIGAMENTS: No discernible tears.    MARROW: No fractures, marrow edema or suspicious bone lesions.    GLENOHUMERAL JOINT: No full-thickness chondral defects.  Normal alignment.    AC JOINT: Minor osteoarthritis with mild subchondral cystlike change, minimal capsular hypertrophy and tiny marginal osteophytes.  Normal alignment.    ACROMIAL ARCH: Type 1 acromion with lateral downsloping.  No os acromiale.  Impression: 1.  Mild supraspinatus and subscapularis tendinosis.  Minor articular surface fraying of the supraspinatus tendon.  No significant rotator cuff tear identified.  2.  Minor acromioclavicular osteoarthritis.  Downsloping acromion.    Finalized on: 8/28/2023 10:04 AM By:  Vinnie Hastings MD  BRRG# [id number]      2023-08-28  10:06:16.561    [facility]      Relevant imaging results reviewed and interpreted by me, discussed with the patient and / or family today.     Other Tests:         Patient Instructions   Assessment:  Andrade Barahona is a RHD 53 y.o. male    with a chief complaint of Pain of the Left Shoulder    Left shoulder subacromial impingement  Left bicep tendonitis    Encounter Diagnoses   Name Primary?    Biceps tendinitis of left upper extremity Yes    Subacromial impingement of left shoulder         Plan:  Can start back up in PT or continue at home exercises.    Follow-up: As needed or sooner if there are any problems between now and then.    Leave Review:   Google: Leave Google Review  Healthgrades: Leave Healthgrades Review    After Hours Number: [phone]      Provider Note/Medical Decision Making:       I discussed worrisome and red flag signs and symptoms with the patient. The patient expressed understanding and agreed to alert me immediately or to go to the emergency room if they experience any of these.   Treatment plan was developed with input from the patient/family, and they expressed understanding and agreement with the plan. All questions were answered today.          Dony Edmondson MD  Orthopaedic Surgery & Sports Medicine       Disclaimer: This note was prepared using a voice recognition system and is likely to have sound alike errors within the text.     I, Estefani Thomas, acted as a scribe for Dony Edmondson MD for the duration of this office visit.

## 2023-10-09 NOTE — PATIENT INSTRUCTIONS
Assessment:  Andrade Barahona is a RHD 53 y.o. male    with a chief complaint of Pain of the Left Shoulder    Left shoulder subacromial impingement  Left bicep tendonitis    Encounter Diagnoses   Name Primary?    Biceps tendinitis of left upper extremity Yes    Subacromial impingement of left shoulder         Plan:  Can start back up in PT or continue at home exercises.    Follow-up: As needed or sooner if there are any problems between now and then.    Leave Review:   Google: Leave Google Review  Healthgrades: Leave Healthgrades Review    After Hours Number: [phone]

## 2023-10-09 NOTE — PROGRESS NOTES
Patient ID: Andrade Barahona  YOB: 1970  MRN: [id number]    Chief Complaint: Pain of the Left Shoulder      Referred By: Stan Wang    History of Present Illness: Andrade Barahona is a RHD 53 y.o. male    with a chief complaint of Pain of the Left Shoulder    Andrade presents to the clinic today for Left shoulder pain s/p CSI 9/14/23. He states he is doing much better and is back to playing golf. He is no longer going to PT    Previous Visit: 09/14/23  Andrade presents to the clinic today with L shoulder pain 5/10 today.  He no longer is going to PT.  He did report some improvement with PT.  Denies neck pain.      HPI    Past Medical History:   Past Medical History:   Diagnosis Date    Diabetes mellitus, type 2     Hyperlipidemia associated with type 2 diabetes mellitus 12/9/2020    Idiopathic chronic gout of multiple sites without tophus 12/9/2020    Recurrent cold sores 12/9/2020    Severe obesity (BMI 35.0-39.9) with comorbidity 12/9/2020     Past Surgical History:   Procedure Laterality Date    APPENDECTOMY      KNEE SURGERY       Family History   Problem Relation Age of Onset    Heart disease Father      Social History     Socioeconomic History    Marital status:    Tobacco Use    Smoking status: Never    Smokeless tobacco: Never   Substance and Sexual Activity    Alcohol use: Yes    Drug use: Never    Sexual activity: Yes     Partners: Female     Medication List with Changes/Refills   Current Medications    ALLOPURINOL (ZYLOPRIM) 300 MG TABLET    TAKE 1 TABLET BY MOUTH EVERY DAY    BLOOD SUGAR DIAGNOSTIC STRP    To check BG two times daily, to use with insurance preferred meter    BLOOD-GLUCOSE METER KIT    To check BG two times daily, to use with insurance preferred meter    EZETIMIBE (ZETIA) 10 MG TABLET    Take 1 tablet (10 mg total) by mouth once daily.    ICOSAPENT ETHYL (VASCEPA) 1 GRAM CAP    TAKE 2 CAPSULES BY MOUTH 2 TIMES DAILY.    LANCETS MISC    To check BG  two times daily, to use with insurance preferred meter    LOVASTATIN (MEVACOR) 40 MG TABLET    TAKE 1 TABLET BY MOUTH EVERY DAY    METFORMIN (GLUCOPHAGE-XR) 500 MG ER 24HR TABLET    Take 1 tablet (500 mg total) by mouth 2 (two) times daily with meals.    NAPROXEN (NAPROSYN) 500 MG TABLET    Take 1 tablet (500 mg total) by mouth 2 (two) times daily.    SEMAGLUTIDE (OZEMPIC) 1 MG/DOSE (4 MG/3 ML)    Inject 1 mg into the skin every 7 days.    TADALAFIL (CIALIS) 20 MG TAB    TAKE 1 TABLET (20 MG TOTAL) BY MOUTH DAILY AS NEEDED (FOR ERECTILE DYSFUNCTION).    VALACYCLOVIR (VALTREX) 1000 MG TABLET    Take 1 tablet (1,000 mg total) by mouth once daily.     Review of patient's allergies indicates:  No Known Allergies  ROS    Physical Exam:   Body mass index is 35.58 kg/m².  There were no vitals filed for this visit.   GENERAL: Well appearing, appropriate for stated age, no acute distress.  CARDIOVASCULAR: Pulses regular by peripheral palpation.  PULMONARY: Respirations are even and non-labored.  NEURO: Awake, alert, and oriented x 3.  PSYCH: Mood & affect are appropriate.  HEENT: Head is normocephalic and atraumatic.  Ortho/SPM Exam  ***    Imaging:    MRI Shoulder Without Contrast Left  Narrative: EXAM:  MRI SHOULDER WITHOUT CONTRAST LEFT    CLINICAL HISTORY: Left shoulder pain. Shoulder pain, rotator cuff disorder suspected, xray done; [M25.512]-Pain in left shoulder.    TECHNIQUE: Standard multiplanar, multisequence MR imaging protocol of the left shoulder was performed.    FINDINGS: Patient motion degrades image quality.    ROTATOR CUFF: Mild supraspinatus and subscapularis tendinosis.  Minor articular surface fraying of the anterior supraspinatus tendon near the critical zone. No full-thickness or significant partial thickness rotator cuff tear identified.    MUSCULATURE: Within normal limits.    BURSITIS: No significant bursitis.    LONG HEAD BICEPS TENDON: Intact and normal in position.    LABRUM and GLENOHUMERAL  LIGAMENTS: No discernible tears.    MARROW: No fractures, marrow edema or suspicious bone lesions.    GLENOHUMERAL JOINT: No full-thickness chondral defects.  Normal alignment.    AC JOINT: Minor osteoarthritis with mild subchondral cystlike change, minimal capsular hypertrophy and tiny marginal osteophytes.  Normal alignment.    ACROMIAL ARCH: Type 1 acromion with lateral downsloping.  No os acromiale.  Impression: 1.  Mild supraspinatus and subscapularis tendinosis.  Minor articular surface fraying of the supraspinatus tendon.  No significant rotator cuff tear identified.  2.  Minor acromioclavicular osteoarthritis.  Downsloping acromion.    Finalized on: 8/28/2023 10:04 AM By:  Vinnie Hastings MD  [facility]# [id number]      2023-08-28 10:06:16.561    [facility]    ***  Relevant imaging results reviewed and interpreted by me, discussed with the patient and / or family today. ***    Other Tests:     ***    There are no Patient Instructions on file for this visit.  Provider Note/Medical Decision Making: ***      I discussed worrisome and red flag signs and symptoms with the patient. The patient expressed understanding and agreed to alert me immediately or to go to the emergency room if they experience any of these.   Treatment plan was developed with input from the patient/family, and they expressed understanding and agreement with the plan. All questions were answered today.          Dony Edmondson MD  Orthopaedic Surgery & Sports Medicine       Disclaimer: This note was prepared using a voice recognition system and is likely to have sound alike errors within the text.

## 2023-11-06 ENCOUNTER — OFFICE VISIT (OUTPATIENT)
Dept: INTERNAL MEDICINE | Facility: CLINIC | Age: 53
End: 2023-11-06
Payer: COMMERCIAL

## 2023-11-06 ENCOUNTER — LAB VISIT (OUTPATIENT)
Dept: LAB | Facility: HOSPITAL | Age: 53
End: 2023-11-06
Payer: COMMERCIAL

## 2023-11-06 VITALS
DIASTOLIC BLOOD PRESSURE: 84 MMHG | TEMPERATURE: 97 F | BODY MASS INDEX: 36.55 KG/M2 | WEIGHT: 255.31 LBS | HEART RATE: 77 BPM | SYSTOLIC BLOOD PRESSURE: 120 MMHG | OXYGEN SATURATION: 97 % | HEIGHT: 70 IN

## 2023-11-06 DIAGNOSIS — E11.69 HYPERLIPIDEMIA ASSOCIATED WITH TYPE 2 DIABETES MELLITUS: Chronic | ICD-10-CM

## 2023-11-06 DIAGNOSIS — E66.01 SEVERE OBESITY (BMI 35.0-39.9) WITH COMORBIDITY: Chronic | ICD-10-CM

## 2023-11-06 DIAGNOSIS — E11.9 TYPE 2 DIABETES MELLITUS WITHOUT COMPLICATION, WITHOUT LONG-TERM CURRENT USE OF INSULIN: Primary | Chronic | ICD-10-CM

## 2023-11-06 DIAGNOSIS — M1A.09X0 IDIOPATHIC CHRONIC GOUT OF MULTIPLE SITES WITHOUT TOPHUS: ICD-10-CM

## 2023-11-06 DIAGNOSIS — E78.5 HYPERLIPIDEMIA ASSOCIATED WITH TYPE 2 DIABETES MELLITUS: Chronic | ICD-10-CM

## 2023-11-06 DIAGNOSIS — E11.9 TYPE 2 DIABETES MELLITUS WITHOUT COMPLICATION, WITHOUT LONG-TERM CURRENT USE OF INSULIN: Chronic | ICD-10-CM

## 2023-11-06 DIAGNOSIS — R79.89 LOW TESTOSTERONE: ICD-10-CM

## 2023-11-06 LAB
ALBUMIN/CREAT UR: NORMAL UG/MG (ref 0–30)
CREAT UR-MCNC: 106 MG/DL (ref 23–375)
MICROALBUMIN UR DL<=1MG/L-MCNC: <5 UG/ML

## 2023-11-06 PROCEDURE — 3079F DIAST BP 80-89 MM HG: CPT | Mod: CPTII,S$GLB,, | Performed by: FAMILY MEDICINE

## 2023-11-06 PROCEDURE — 3008F PR BODY MASS INDEX (BMI) DOCUMENTED: ICD-10-PCS | Mod: CPTII,S$GLB,, | Performed by: FAMILY MEDICINE

## 2023-11-06 PROCEDURE — 1159F PR MEDICATION LIST DOCUMENTED IN MEDICAL RECORD: ICD-10-PCS | Mod: CPTII,S$GLB,, | Performed by: FAMILY MEDICINE

## 2023-11-06 PROCEDURE — 82043 UR ALBUMIN QUANTITATIVE: CPT | Performed by: FAMILY MEDICINE

## 2023-11-06 PROCEDURE — 99999 PR PBB SHADOW E&M-EST. PATIENT-LVL IV: ICD-10-PCS | Mod: PBBFAC,,, | Performed by: FAMILY MEDICINE

## 2023-11-06 PROCEDURE — 1160F PR REVIEW ALL MEDS BY PRESCRIBER/CLIN PHARMACIST DOCUMENTED: ICD-10-PCS | Mod: CPTII,S$GLB,, | Performed by: FAMILY MEDICINE

## 2023-11-06 PROCEDURE — 3072F LOW RISK FOR RETINOPATHY: CPT | Mod: CPTII,S$GLB,, | Performed by: FAMILY MEDICINE

## 2023-11-06 PROCEDURE — 3008F BODY MASS INDEX DOCD: CPT | Mod: CPTII,S$GLB,, | Performed by: FAMILY MEDICINE

## 2023-11-06 PROCEDURE — 3074F SYST BP LT 130 MM HG: CPT | Mod: CPTII,S$GLB,, | Performed by: FAMILY MEDICINE

## 2023-11-06 PROCEDURE — 99999 PR PBB SHADOW E&M-EST. PATIENT-LVL IV: CPT | Mod: PBBFAC,,, | Performed by: FAMILY MEDICINE

## 2023-11-06 PROCEDURE — 3051F HG A1C>EQUAL 7.0%<8.0%: CPT | Mod: CPTII,S$GLB,, | Performed by: FAMILY MEDICINE

## 2023-11-06 PROCEDURE — 1159F MED LIST DOCD IN RCRD: CPT | Mod: CPTII,S$GLB,, | Performed by: FAMILY MEDICINE

## 2023-11-06 PROCEDURE — 3072F PR LOW RISK FOR RETINOPATHY: ICD-10-PCS | Mod: CPTII,S$GLB,, | Performed by: FAMILY MEDICINE

## 2023-11-06 PROCEDURE — 99214 PR OFFICE/OUTPT VISIT, EST, LEVL IV, 30-39 MIN: ICD-10-PCS | Mod: S$GLB,,, | Performed by: FAMILY MEDICINE

## 2023-11-06 PROCEDURE — 1160F RVW MEDS BY RX/DR IN RCRD: CPT | Mod: CPTII,S$GLB,, | Performed by: FAMILY MEDICINE

## 2023-11-06 PROCEDURE — 3079F PR MOST RECENT DIASTOLIC BLOOD PRESSURE 80-89 MM HG: ICD-10-PCS | Mod: CPTII,S$GLB,, | Performed by: FAMILY MEDICINE

## 2023-11-06 PROCEDURE — 3051F PR MOST RECENT HEMOGLOBIN A1C LEVEL 7.0 - < 8.0%: ICD-10-PCS | Mod: CPTII,S$GLB,, | Performed by: FAMILY MEDICINE

## 2023-11-06 PROCEDURE — 3074F PR MOST RECENT SYSTOLIC BLOOD PRESSURE < 130 MM HG: ICD-10-PCS | Mod: CPTII,S$GLB,, | Performed by: FAMILY MEDICINE

## 2023-11-06 PROCEDURE — 99214 OFFICE O/P EST MOD 30 MIN: CPT | Mod: S$GLB,,, | Performed by: FAMILY MEDICINE

## 2023-11-06 RX ORDER — TESTOSTERONE CYPIONATE 200 MG/ML
120 INJECTION, SOLUTION INTRAMUSCULAR
COMMUNITY

## 2023-11-06 RX ORDER — LOVASTATIN 40 MG/1
40 TABLET ORAL DAILY
Qty: 90 TABLET | Refills: 3 | Status: SHIPPED | OUTPATIENT
Start: 2023-11-06

## 2023-11-06 RX ORDER — ICOSAPENT ETHYL 1000 MG/1
2 CAPSULE ORAL 2 TIMES DAILY
Qty: 360 CAPSULE | Refills: 3 | Status: SHIPPED | OUTPATIENT
Start: 2023-11-06

## 2023-11-06 RX ORDER — ALLOPURINOL 300 MG/1
300 TABLET ORAL DAILY
Qty: 90 TABLET | Refills: 3 | Status: SHIPPED | OUTPATIENT
Start: 2023-11-06

## 2023-11-06 RX ORDER — METFORMIN HYDROCHLORIDE 500 MG/1
500 TABLET, EXTENDED RELEASE ORAL 2 TIMES DAILY WITH MEALS
Qty: 180 TABLET | Refills: 3 | Status: SHIPPED | OUTPATIENT
Start: 2023-11-06 | End: 2024-11-05

## 2023-11-06 NOTE — PROGRESS NOTES
Subjective:   Patient ID: Andrade Barahona is a 53 y.o. male.  Chief Complaint:  Follow-up and Diabetes    Presents for six-month follow-up chronic medical conditions    Last visit May 2023 for annual physical exam   Lipid panel with LDL at goal less than 100.    CMP with elevated glucose, but otherwise normal kidney and liver function  A1c 7.7%.  Diabetes remains controlled just not as well as previous testing.    Thyroid testing normal   Testosterone panel low total and bioavailable levels.  Low normal free levels     Medical History:  - Diabetes mellitus.  Last A1c less than 8%.  On metformin 1000 mg twice a day and Ozempic 1 mg weekly injections.  Reports compliance.  Denies side effects.  Denies symptoms hypo or hyperglycemia.  Needs repeat A1c, eye exam, foot exam, microalbumin.  - Hyperlipidemia.  Last lipid panel stable.  On Mevacor 40 mg daily and Zetia 10 mg and Vascepa 1 g 2 tablets twice a day.  Reports compliance.  Denies side effects.  Denies chest pain or claudication.  Needs repeat lipid panel.  - Gout.  On allopurinol 300 mg daily.  No recurrent attacks in greater than 12 months.  - Fever blister/cold sores.  Valtrex 1000 mg 2 tablets 12 hours apart as needed for each outbreak  - Erectile dysfunction.  Cialis as needed.     Health maintenance:  - Colon cancer screening up-to-date   - Prostate cancer screening up-to-date  - Needs tetanus booster  - Needs COVID booster   - Needs shingles vaccine series   - Needs pneumonia vaccine  - No recent flu vaccine      Complains of persistent shoulder pain for which he was referred to orthopedist last visit    Also recently started Depo-Testosterone injections but has not noticed any improvement in symptoms yet    No new complaints or concerns today    Review of Systems   Constitutional:  Negative for chills, fatigue and fever.   Eyes:  Negative for visual disturbance.   Respiratory:  Negative for cough, chest tightness, shortness of breath and wheezing.   "  Cardiovascular:  Negative for chest pain, palpitations and leg swelling.   Gastrointestinal:  Negative for abdominal pain, constipation, diarrhea, nausea and vomiting.   Endocrine: Negative for polydipsia, polyphagia and polyuria.   Musculoskeletal:  Positive for arthralgias. Negative for myalgias.   Skin:  Negative for rash.   Neurological:  Negative for dizziness, syncope, weakness, numbness and headaches.     Objective:   /84 (BP Location: Left arm, Patient Position: Sitting, BP Method: Large (Manual))   Pulse 77   Temp 97 °F (36.1 °C) (Tympanic)   Ht 5' 10" (1.778 m)   Wt 115.8 kg (255 lb 4.7 oz)   SpO2 97%   BMI 36.63 kg/m²     Physical Exam  Vitals and nursing note reviewed.   Constitutional:       General: He is not in acute distress.     Appearance: Normal appearance. He is well-developed. He is obese.   Eyes:      General: No scleral icterus.        Right eye: No discharge.         Left eye: No discharge.      Conjunctiva/sclera: Conjunctivae normal.   Neck:      Vascular: No JVD.   Cardiovascular:      Rate and Rhythm: Normal rate and regular rhythm.      Heart sounds: Normal heart sounds. No murmur heard.     No friction rub. No gallop.   Pulmonary:      Effort: Pulmonary effort is normal.      Breath sounds: Normal breath sounds.   Abdominal:      General: There is no distension.      Palpations: Abdomen is soft.      Tenderness: There is no abdominal tenderness. There is no guarding or rebound.   Musculoskeletal:      Right lower leg: No edema.      Left lower leg: No edema.   Feet:      Right foot:      Protective Sensation: 5 sites tested.  3 sites sensed.      Skin integrity: Callus and dry skin present. No ulcer, blister, skin breakdown, erythema, warmth or fissure.      Toenail Condition: Right toenails are normal.      Left foot:      Protective Sensation: 5 sites tested.  3 sites sensed.      Skin integrity: Callus and dry skin present. No ulcer, blister, skin breakdown, erythema, " warmth or fissure.      Toenail Condition: Left toenails are normal.      Comments:   Mild early tinea pedis skin changes bilateral webspace between 4th and 5th digits  Skin:     Findings: No rash.   Neurological:      General: No focal deficit present.      Mental Status: He is alert and oriented to person, place, and time.      Gait: Gait is intact.   Psychiatric:         Attention and Perception: Attention normal.         Mood and Affect: Mood and affect normal.         Thought Content: Thought content normal.       Assessment:       ICD-10-CM ICD-9-CM   1. Type 2 diabetes mellitus without complication, without long-term current use of insulin  E11.9 250.00   2. Severe obesity (BMI 35.0-39.9) with comorbidity  E66.01 278.01   3. Hyperlipidemia associated with type 2 diabetes mellitus  E11.69 250.80    E78.5 272.4   4. Idiopathic chronic gout of multiple sites without tophus  M1A.09X0 274.02   5. Low testosterone  R79.89 790.99     Plan:   Type 2 diabetes mellitus without complication, without long-term current use of insulin  Severe obesity (BMI 35.0-39.9) with comorbidity  -     Microalbumin/Creatinine Ratio, Urine; Future; Expected date: 11/06/2023  -     Hemoglobin A1C; Future; Expected date: 11/06/2023  -     metFORMIN (GLUCOPHAGE-XR) 500 MG ER 24hr tablet; Take 1 tablet (500 mg total) by mouth 2 (two) times daily with meals.  Dispense: 180 tablet; Refill: 3  Asymptomatic   Check A1c today   Continue metformin  mg 2 tablets twice a day  If A1c less than 8%, continue Ozempic 1 mg weekly injection  If A1c greater than 8%, increase Ozempic 2 mg weekly injection  Eye exam scheduled next week   Microalbumin today.  If positive needs a/Arb or SGLT2   Foot exam remarkable for dry skin callus tinea pedis  Recommend over-the-counter moisturizing lotion and Lamisil cream daily    Hyperlipidemia associated with type 2 diabetes mellitus  -     icosapent ethyL (VASCEPA) 1 gram Cap; Take 2 capsules (2,000 mg total)  by mouth 2 (two) times daily.  Dispense: 360 capsule; Refill: 3  -     lovastatin (MEVACOR) 40 MG tablet; Take 1 tablet (40 mg total) by mouth once daily.  Dispense: 90 tablet; Refill: 3  Stable.  Asymptomatic.  Last LDL at goal.    Continue Zetia 10 mg daily   Continue Vascepa 2 g twice a day   Continue Mevacor 40 mg daily     Idiopathic chronic gout of multiple sites without tophus  -     allopurinoL (ZYLOPRIM) 300 MG tablet; Take 1 tablet (300 mg total) by mouth once daily.  Dispense: 90 tablet; Refill: 3  Stable   No frequent recurrent attacks  Continue preventative medication     Low testosterone  Okay to take/continue testosterone supplement  A provider   Will need to have CBC, A1c, lipids, and PSA monitoring more frequently  Discussed if did not notice any improvement in symptoms with treatment, would not recommend long-term supplementation    Routine health maintenance  Declines all recommended vaccines     Return to clinic 6 months for annual physical exam or sooner as needed

## 2023-11-25 DIAGNOSIS — E11.9 TYPE 2 DIABETES MELLITUS WITHOUT COMPLICATION, WITHOUT LONG-TERM CURRENT USE OF INSULIN: ICD-10-CM

## 2023-11-25 DIAGNOSIS — E66.01 SEVERE OBESITY (BMI 35.0-39.9) WITH COMORBIDITY: ICD-10-CM

## 2023-11-25 NOTE — TELEPHONE ENCOUNTER
Care Due:                  Date            Visit Type   Department     Provider  --------------------------------------------------------------------------------                                EP -                              PRIMARY      [facility] INTERNAL  Last Visit: 11-      CARE ([facility])   MEDICINE       Stan Wang                              EP -                              PRIMARY      [facility] INTER[facility]  Next Visit: 05-      CARE ([facility])   MEDICINE       Stan Wang                                                            Last  Test          Frequency    Reason                     Performed    Due Date  --------------------------------------------------------------------------------    Uric Acid...  12 months..  allopurinoL..............  12- 12-    [facility] Embedded Care Due Messages. Reference number: [id number].   11/25/2023 8:53:10 AM CST

## 2023-11-26 RX ORDER — SEMAGLUTIDE 1.34 MG/ML
1 INJECTION, SOLUTION SUBCUTANEOUS
Qty: 9 EACH | Refills: 1 | Status: SHIPPED | OUTPATIENT
Start: 2023-11-26

## 2023-11-26 NOTE — TELEPHONE ENCOUNTER
Provider Staff:  Action required for this patient    Requires labs      Please see care gap opportunities below in Care Due Message.    Thanks!  [facility]     Appointments      Date Provider   Last Visit   11/6/2023 Stan Wang MD   Next Visit   5/6/2024 Stan Wang MD     Refill Decision Note   Andrade Barahona  is requesting a refill authorization.  Brief Assessment and Rationale for Refill:  Approve     Medication Therapy Plan:         Comments:     Note composed:10:38 AM 11/26/2023

## 2023-12-07 RX ORDER — TADALAFIL 20 MG/1
20 TABLET ORAL DAILY PRN
Qty: 6 TABLET | Refills: 5 | Status: SHIPPED | OUTPATIENT
Start: 2023-12-07 | End: 2024-02-21 | Stop reason: SDUPTHER

## 2023-12-07 NOTE — TELEPHONE ENCOUNTER
No care due was identified.  [facility] Embedded Care Due Messages. Reference number: [id number].   12/07/2023 12:57:58 AM CST

## 2023-12-07 NOTE — TELEPHONE ENCOUNTER
Refill Decision Note   Andrade Barahona  is requesting a refill authorization.  Brief Assessment and Rationale for Refill:  Approve     Medication Therapy Plan:         Comments:     Note composed:11:50 AM 12/07/2023             Appointments     Last Visit   11/6/2023 Stan Wang MD   Next Visit   5/6/2024 Stan Wang MD

## 2023-12-18 ENCOUNTER — PATIENT MESSAGE (OUTPATIENT)
Dept: OPHTHALMOLOGY | Facility: CLINIC | Age: 53
End: 2023-12-18
Payer: COMMERCIAL

## 2023-12-18 ENCOUNTER — OFFICE VISIT (OUTPATIENT)
Dept: OPHTHALMOLOGY | Facility: CLINIC | Age: 53
End: 2023-12-18
Payer: COMMERCIAL

## 2023-12-18 DIAGNOSIS — E11.9 DIABETES MELLITUS TYPE 2 WITHOUT RETINOPATHY: Primary | ICD-10-CM

## 2023-12-18 DIAGNOSIS — H52.7 REFRACTIVE ERRORS: ICD-10-CM

## 2023-12-18 PROCEDURE — 3061F PR NEG MICROALBUMINURIA RESULT DOCUMENTED/REVIEW: ICD-10-PCS | Mod: CPTII,S$GLB,, | Performed by: OPTOMETRIST

## 2023-12-18 PROCEDURE — 3061F NEG MICROALBUMINURIA REV: CPT | Mod: CPTII,S$GLB,, | Performed by: OPTOMETRIST

## 2023-12-18 PROCEDURE — 3066F PR DOCUMENTATION OF TREATMENT FOR NEPHROPATHY: ICD-10-PCS | Mod: CPTII,S$GLB,, | Performed by: OPTOMETRIST

## 2023-12-18 PROCEDURE — 1159F MED LIST DOCD IN RCRD: CPT | Mod: CPTII,S$GLB,, | Performed by: OPTOMETRIST

## 2023-12-18 PROCEDURE — 3066F NEPHROPATHY DOC TX: CPT | Mod: CPTII,S$GLB,, | Performed by: OPTOMETRIST

## 2023-12-18 PROCEDURE — 2023F DILAT RTA XM W/O RTNOPTHY: CPT | Mod: CPTII,S$GLB,, | Performed by: OPTOMETRIST

## 2023-12-18 PROCEDURE — 99999 PR PBB SHADOW E&M-EST. PATIENT-LVL III: ICD-10-PCS | Mod: PBBFAC,,, | Performed by: OPTOMETRIST

## 2023-12-18 PROCEDURE — 3044F PR MOST RECENT HEMOGLOBIN A1C LEVEL <7.0%: ICD-10-PCS | Mod: CPTII,S$GLB,, | Performed by: OPTOMETRIST

## 2023-12-18 PROCEDURE — 3044F HG A1C LEVEL LT 7.0%: CPT | Mod: CPTII,S$GLB,, | Performed by: OPTOMETRIST

## 2023-12-18 PROCEDURE — 92015 DETERMINE REFRACTIVE STATE: CPT | Mod: S$GLB,,, | Performed by: OPTOMETRIST

## 2023-12-18 PROCEDURE — 1159F PR MEDICATION LIST DOCUMENTED IN MEDICAL RECORD: ICD-10-PCS | Mod: CPTII,S$GLB,, | Performed by: OPTOMETRIST

## 2023-12-18 PROCEDURE — 2023F PR DILATED RETINAL EXAM W/O EVID OF RETINOPATHY: ICD-10-PCS | Mod: CPTII,S$GLB,, | Performed by: OPTOMETRIST

## 2023-12-18 PROCEDURE — 92015 PR REFRACTION: ICD-10-PCS | Mod: S$GLB,,, | Performed by: OPTOMETRIST

## 2023-12-18 PROCEDURE — 92014 PR EYE EXAM, EST PATIENT,COMPREHESV: ICD-10-PCS | Mod: S$GLB,,, | Performed by: OPTOMETRIST

## 2023-12-18 PROCEDURE — 99999 PR PBB SHADOW E&M-EST. PATIENT-LVL III: CPT | Mod: PBBFAC,,, | Performed by: OPTOMETRIST

## 2023-12-18 PROCEDURE — 92014 COMPRE OPH EXAM EST PT 1/>: CPT | Mod: S$GLB,,, | Performed by: OPTOMETRIST

## 2023-12-18 NOTE — PROGRESS NOTES
SUBJECTIVE  Andrade Barahona is 53 y.o. male  Corrected distance visual acuity was 20/20 in the right eye and 20/25 +2 in the left eye. Uncorrected near visual acuity was J1 in the right eye and J1 in the left eye.   Chief Complaint   Patient presents with    Diabetic Eye Exam     Pt here for Dm eye exam.Pt states his BS has been stable,  Pt states he is having some vision changes. Pt states he wears his glasses for distance. Pt denies flashes and floaters.           HPI     Diabetic Eye Exam     Additional comments: Pt here for Dm eye exam.Pt states his BS has been   stable,  Pt states he is having some vision changes. Pt states he wears   his glasses for distance. Pt denies flashes and floaters.           Last edited by Ana Maria Rodarte on 12/18/2023 10:26 AM.         Assessment /Plan :  1. Diabetes mellitus type 2 without retinopathy   No Background Diabetic Retinopathy  Strict BG control, f/u w/ PCP, and annual DFE  Stressed importance of DM control to preserve vision     2. Refractive errors   Dispense Final Rx for glasses.  RTC 1 year  Discussed above and answered questions.

## 2024-04-15 ENCOUNTER — E-VISIT (OUTPATIENT)
Dept: FAMILY MEDICINE | Facility: CLINIC | Age: 54
End: 2024-04-15
Payer: COMMERCIAL

## 2024-04-15 DIAGNOSIS — H10.9 CONJUNCTIVITIS, UNSPECIFIED CONJUNCTIVITIS TYPE, UNSPECIFIED LATERALITY: Primary | ICD-10-CM

## 2024-04-15 DIAGNOSIS — H57.10 PAIN IN EYE, UNSPECIFIED LATERALITY: ICD-10-CM

## 2024-04-15 PROCEDURE — 99422 OL DIG E/M SVC 11-20 MIN: CPT | Mod: ,,, | Performed by: STUDENT IN AN ORGANIZED HEALTH CARE EDUCATION/TRAINING PROGRAM

## 2024-04-15 RX ORDER — CIPROFLOXACIN HYDROCHLORIDE 3 MG/ML
SOLUTION/ DROPS OPHTHALMIC
Qty: 10 ML | Refills: 0 | Status: SHIPPED | OUTPATIENT
Start: 2024-04-15 | End: 2024-05-06

## 2024-04-15 RX ORDER — NAPROXEN 500 MG/1
500 TABLET ORAL 2 TIMES DAILY PRN
Qty: 14 TABLET | Refills: 0 | Status: SHIPPED | OUTPATIENT
Start: 2024-04-15 | End: 2024-05-06

## 2024-04-15 NOTE — PROGRESS NOTES
Patient ID: Andrade Barahona is a 53 y.o. male.    Chief Complaint: Conjunctivitis (Entered automatically based on patient selection in Patient Portal.)          274}  The patient initiated a request through [employer] on 4/15/2024 for evaluation and management with a chief complaint of Conjunctivitis (Entered automatically based on patient selection in Patient Portal.)     I evaluated the questionnaire submission on 04/15/2024 .    Total Time (in minutes): 11     Ohs Peq Evisit Red Eye    4/15/2024  7:24 AM CDT - Filed by Patient   Do you agree to participate in an E-Visit? Yes   If you have any of the following symptoms, please present to your local ER or call 911:  I acknowledge   What is the main issue you would like addressed today? Swollen right eye, no discharge just dry   Are you able to take your vital signs? No   Which of the following have you been experiencing? One eye is red;  Eye pain   Have you had any of the following? None of the above    How long have you been having these symptoms? For a few days   Do you have a fever? No, I do not have a fever   Are your symptoms associated with swimming? No, I have not been swimming recently   Have your eyes been exposed to any chemicals, creams, or drops that may be causing irritation? No   Have you suffered any recent injury to your eyes? No   Have you been exposed to anyone with similar symptoms? No   Did or do you wear contact lenses? No   Have you had any of the following? None of the above   Have you had any of the following in the past? I have not had any past problems with my eyes.   What medications are you currently using for these symptoms? Eye drops from the shelf in the pharmacy;  Nothing   Provide any additional information you feel is important.    At least one photo is required for treatment to be provided. You can upload a maximum of three photos of the affected area.            Active Problem List with Overview Notes    Diagnosis Date Noted    Type 2  diabetes mellitus without complication, without long-term current use of insulin 12/09/2020    Hyperlipidemia associated with type 2 diabetes mellitus 12/09/2020    Idiopathic chronic gout of multiple sites without tophus 12/09/2020    Recurrent cold sores 12/09/2020    Severe obesity (BMI 35.0-39.9) with comorbidity 12/09/2020    Family history of sudden cardiac death 12/09/2020      Recent Labs Obtained:  Lab Results   Component Value Date    WBC 9.14 05/08/2023    HGB 14.7 05/08/2023    HCT 41.3 05/08/2023    MCV 89 05/08/2023     05/08/2023     05/08/2023    K 4.6 05/08/2023     (H) 05/08/2023    CREATININE 1.0 05/08/2023    EGFRNORACEVR >60.0 05/08/2023    HGBA1C 6.9 (H) 11/06/2023      Review of patient's allergies indicates:  No Known Allergies    Encounter Diagnoses   Name Primary?    Conjunctivitis, unspecified conjunctivitis type, unspecified laterality Yes    Pain in eye, unspecified laterality         No orders of the defined types were placed in this encounter.     Medications Ordered This Encounter   Medications    ciprofloxacin HCl (CILOXAN) 0.3 % ophthalmic solution     Sig: Use 1 to 2 drops to the affected eye every 2 hours while awake for 2 days,then 1 to 2 drops every 4 hours while awake for the next 5 days     Dispense:  10 mL     Refill:  0    naproxen (NAPROSYN) 500 MG tablet     Sig: Take 1 tablet (500 mg total) by mouth 2 (two) times daily as needed (pain).     Dispense:  14 tablet     Refill:  0        E-Visit Time Tracking:    Day 1 Time (in minutes): 11    Total Time (in minutes): 11      274}

## 2024-04-16 DIAGNOSIS — E78.5 HYPERLIPIDEMIA ASSOCIATED WITH TYPE 2 DIABETES MELLITUS: ICD-10-CM

## 2024-04-16 DIAGNOSIS — E66.01 SEVERE OBESITY (BMI 35.0-39.9) WITH COMORBIDITY: ICD-10-CM

## 2024-04-16 DIAGNOSIS — E11.69 HYPERLIPIDEMIA ASSOCIATED WITH TYPE 2 DIABETES MELLITUS: ICD-10-CM

## 2024-04-16 DIAGNOSIS — E11.9 TYPE 2 DIABETES MELLITUS WITHOUT COMPLICATION, WITHOUT LONG-TERM CURRENT USE OF INSULIN: ICD-10-CM

## 2024-04-16 NOTE — TELEPHONE ENCOUNTER
No care due was identified.  [facility] Embedded Care Due Messages. Reference number: [id number].   4/16/2024 6:03:19 PM CDT

## 2024-04-17 RX ORDER — EZETIMIBE 10 MG/1
10 TABLET ORAL
Qty: 90 TABLET | Refills: 0 | Status: SHIPPED | OUTPATIENT
Start: 2024-04-17 | End: 2024-05-06 | Stop reason: SDUPTHER

## 2024-04-17 RX ORDER — SEMAGLUTIDE 1.34 MG/ML
1 INJECTION, SOLUTION SUBCUTANEOUS
Qty: 9 ML | Refills: 0 | Status: SHIPPED | OUTPATIENT
Start: 2024-04-17 | End: 2024-05-06 | Stop reason: SDUPTHER

## 2024-04-17 NOTE — TELEPHONE ENCOUNTER
Refill Decision Note   Andrade Barahona  is requesting a refill authorization.  Brief Assessment and Rationale for Refill:  Approve     Medication Therapy Plan:         Comments:     Note composed:4:07 PM 04/17/2024

## 2024-05-06 ENCOUNTER — OFFICE VISIT (OUTPATIENT)
Dept: INTERNAL MEDICINE | Facility: CLINIC | Age: 54
End: 2024-05-06
Payer: COMMERCIAL

## 2024-05-06 ENCOUNTER — LAB VISIT (OUTPATIENT)
Dept: LAB | Facility: HOSPITAL | Age: 54
End: 2024-05-06
Attending: FAMILY MEDICINE
Payer: COMMERCIAL

## 2024-05-06 VITALS
TEMPERATURE: 97 F | DIASTOLIC BLOOD PRESSURE: 78 MMHG | SYSTOLIC BLOOD PRESSURE: 122 MMHG | OXYGEN SATURATION: 98 % | WEIGHT: 256.38 LBS | HEART RATE: 80 BPM | HEIGHT: 70 IN | BODY MASS INDEX: 36.7 KG/M2

## 2024-05-06 DIAGNOSIS — E78.5 HYPERLIPIDEMIA ASSOCIATED WITH TYPE 2 DIABETES MELLITUS: ICD-10-CM

## 2024-05-06 DIAGNOSIS — Z00.00 ANNUAL PHYSICAL EXAM: Primary | ICD-10-CM

## 2024-05-06 DIAGNOSIS — M1A.09X0 IDIOPATHIC CHRONIC GOUT OF MULTIPLE SITES WITHOUT TOPHUS: ICD-10-CM

## 2024-05-06 DIAGNOSIS — B00.1 RECURRENT COLD SORES: ICD-10-CM

## 2024-05-06 DIAGNOSIS — R79.89 LOW TESTOSTERONE: ICD-10-CM

## 2024-05-06 DIAGNOSIS — E11.69 HYPERLIPIDEMIA ASSOCIATED WITH TYPE 2 DIABETES MELLITUS: ICD-10-CM

## 2024-05-06 DIAGNOSIS — E66.01 SEVERE OBESITY (BMI 35.0-39.9) WITH COMORBIDITY: Chronic | ICD-10-CM

## 2024-05-06 DIAGNOSIS — E11.9 TYPE 2 DIABETES MELLITUS WITHOUT COMPLICATION, WITHOUT LONG-TERM CURRENT USE OF INSULIN: Chronic | ICD-10-CM

## 2024-05-06 DIAGNOSIS — N52.9 ERECTILE DYSFUNCTION, UNSPECIFIED ERECTILE DYSFUNCTION TYPE: ICD-10-CM

## 2024-05-06 DIAGNOSIS — Z12.5 SCREENING FOR PROSTATE CANCER: ICD-10-CM

## 2024-05-06 DIAGNOSIS — Z00.00 ANNUAL PHYSICAL EXAM: ICD-10-CM

## 2024-05-06 LAB
ALBUMIN SERPL BCP-MCNC: 4.2 G/DL (ref 3.5–5.2)
ALP SERPL-CCNC: 51 U/L (ref 55–135)
ALT SERPL W/O P-5'-P-CCNC: 37 U/L (ref 10–44)
ANION GAP SERPL CALC-SCNC: 11 MMOL/L (ref 8–16)
AST SERPL-CCNC: 34 U/L (ref 10–40)
BILIRUB SERPL-MCNC: 0.8 MG/DL (ref 0.1–1)
BUN SERPL-MCNC: 13 MG/DL (ref 6–20)
CALCIUM SERPL-MCNC: 9.6 MG/DL (ref 8.7–10.5)
CHLORIDE SERPL-SCNC: 104 MMOL/L (ref 95–110)
CHOLEST SERPL-MCNC: 138 MG/DL (ref 120–199)
CHOLEST/HDLC SERPL: 3.9 {RATIO} (ref 2–5)
CO2 SERPL-SCNC: 24 MMOL/L (ref 23–29)
COMPLEXED PSA SERPL-MCNC: 0.64 NG/ML (ref 0–4)
CREAT SERPL-MCNC: 1 MG/DL (ref 0.5–1.4)
ERYTHROCYTE [DISTWIDTH] IN BLOOD BY AUTOMATED COUNT: 12.7 % (ref 11.5–14.5)
EST. GFR  (NO RACE VARIABLE): >60 ML/MIN/1.73 M^2
ESTIMATED AVG GLUCOSE: 169 MG/DL (ref 68–131)
GLUCOSE SERPL-MCNC: 150 MG/DL (ref 70–110)
HBA1C MFR BLD: 7.5 % (ref 4–5.6)
HCT VFR BLD AUTO: 49.7 % (ref 40–54)
HDLC SERPL-MCNC: 35 MG/DL (ref 40–75)
HDLC SERPL: 25.4 % (ref 20–50)
HGB BLD-MCNC: 17.2 G/DL (ref 14–18)
LDLC SERPL CALC-MCNC: 71 MG/DL (ref 63–159)
MCH RBC QN AUTO: 31.5 PG (ref 27–31)
MCHC RBC AUTO-ENTMCNC: 34.6 G/DL (ref 32–36)
MCV RBC AUTO: 91 FL (ref 82–98)
NONHDLC SERPL-MCNC: 103 MG/DL
PLATELET # BLD AUTO: 209 K/UL (ref 150–450)
PMV BLD AUTO: 10.6 FL (ref 9.2–12.9)
POTASSIUM SERPL-SCNC: 4.6 MMOL/L (ref 3.5–5.1)
PROT SERPL-MCNC: 7.4 G/DL (ref 6–8.4)
RBC # BLD AUTO: 5.46 M/UL (ref 4.6–6.2)
SODIUM SERPL-SCNC: 139 MMOL/L (ref 136–145)
TESTOST SERPL-MCNC: 85 NG/DL (ref 304–1227)
TRIGL SERPL-MCNC: 160 MG/DL (ref 30–150)
TSH SERPL DL<=0.005 MIU/L-ACNC: 1.67 UIU/ML (ref 0.4–4)
URATE SERPL-MCNC: 6.3 MG/DL (ref 3.4–7)
WBC # BLD AUTO: 9.35 K/UL (ref 3.9–12.7)

## 2024-05-06 PROCEDURE — 83036 HEMOGLOBIN GLYCOSYLATED A1C: CPT | Performed by: FAMILY MEDICINE

## 2024-05-06 PROCEDURE — 84550 ASSAY OF BLOOD/URIC ACID: CPT | Performed by: FAMILY MEDICINE

## 2024-05-06 PROCEDURE — 80061 LIPID PANEL: CPT | Performed by: FAMILY MEDICINE

## 2024-05-06 PROCEDURE — 84153 ASSAY OF PSA TOTAL: CPT | Performed by: FAMILY MEDICINE

## 2024-05-06 PROCEDURE — 85027 COMPLETE CBC AUTOMATED: CPT | Performed by: FAMILY MEDICINE

## 2024-05-06 PROCEDURE — 80053 COMPREHEN METABOLIC PANEL: CPT | Performed by: FAMILY MEDICINE

## 2024-05-06 PROCEDURE — 99999 PR PBB SHADOW E&M-EST. PATIENT-LVL III: CPT | Mod: PBBFAC,,, | Performed by: FAMILY MEDICINE

## 2024-05-06 PROCEDURE — 99396 PREV VISIT EST AGE 40-64: CPT | Mod: S$GLB,,, | Performed by: FAMILY MEDICINE

## 2024-05-06 PROCEDURE — 84443 ASSAY THYROID STIM HORMONE: CPT | Performed by: FAMILY MEDICINE

## 2024-05-06 PROCEDURE — 84403 ASSAY OF TOTAL TESTOSTERONE: CPT | Performed by: FAMILY MEDICINE

## 2024-05-06 PROCEDURE — 36415 COLL VENOUS BLD VENIPUNCTURE: CPT | Performed by: FAMILY MEDICINE

## 2024-05-06 RX ORDER — VALACYCLOVIR HYDROCHLORIDE 1 G/1
1000 TABLET, FILM COATED ORAL DAILY
Qty: 90 TABLET | Refills: 3 | Status: SHIPPED | OUTPATIENT
Start: 2024-05-06

## 2024-05-06 RX ORDER — SEMAGLUTIDE 1.34 MG/ML
1 INJECTION, SOLUTION SUBCUTANEOUS
Qty: 9 ML | Refills: 3 | Status: SHIPPED | OUTPATIENT
Start: 2024-05-06

## 2024-05-06 RX ORDER — EZETIMIBE 10 MG/1
10 TABLET ORAL DAILY
Qty: 90 TABLET | Refills: 3 | Status: SHIPPED | OUTPATIENT
Start: 2024-05-06 | End: 2025-05-06

## 2024-05-06 NOTE — PROGRESS NOTES
Subjective:   Patient ID: Andrade Barahona is a 53 y.o. male.  Chief Complaint:  No chief complaint on file.    Presents for annual physical exam and follow-up chronic medical conditions    Last visit November 2023   A1c 6.9%  Microalbumin negative     Last annual physical exam May 2023  Lipid panel with LDL at goal less than 100.    CMP with elevated glucose, but otherwise normal kidney and liver function  A1c 7.7%.  Diabetes remains controlled just not as well as previous testing.    Thyroid testing normal   Testosterone panel low total and bioavailable levels.  Low normal free levels     Medical History:  - Diabetes mellitus.  Last A1c less than 8%.  On metformin 1000 mg twice a day and Ozempic 1 mg weekly injections.  Reports compliance.  Denies side effects.  Denies symptoms hypo or hyperglycemia.  Eye exam, foot exam, microalbumin up-to-date.  Needs repeat A1c.  - Hyperlipidemia.  Last lipid panel stable.  On Mevacor 40 mg daily and Zetia 10 mg and Vascepa 1 g 2 tablets twice a day.  Reports compliance.  Denies side effects.  Denies chest pain or claudication.  Needs repeat lipid panel.  - Gout.  On allopurinol 300 mg daily.  No recurrent attacks in greater than 12 months.  - Fever blister/cold sores.  Valtrex 1000 mg 2 tablets 12 hours apart as needed for each outbreak  - Low testosterone.  Previously on testosterone injections.  Significant improvement in symptoms.  No side effects.  Discontinued medication due to frequency of injections and pain from weekly injections.  Wants to restart supplementation.  Questions what his options are.  Interested in possible  not wanting to  - Erectile dysfunction.  Cialis as needed.     Health maintenance:  - Colon cancer screening up-to-date   - Needs prostate cancer screening  - Needs tetanus booster  - Needs COVID booster   - Needs shingles vaccine series   - Needs pneumonia vaccine  - No recent flu vaccine     No new complaints or concerns today      Review of Systems  "  Constitutional:  Positive for fatigue. Negative for chills, diaphoresis and fever.   Eyes:  Negative for visual disturbance.   Respiratory:  Negative for cough, chest tightness, shortness of breath and wheezing.    Cardiovascular:  Negative for chest pain, palpitations and leg swelling.   Gastrointestinal:  Negative for abdominal pain, constipation, diarrhea, nausea and vomiting.   Endocrine: Negative for cold intolerance, heat intolerance, polydipsia, polyphagia and polyuria.   Genitourinary:  Negative for difficulty urinating.   Musculoskeletal:  Negative for myalgias and neck pain.   Skin:  Negative for rash.   Neurological:  Negative for dizziness, tremors, syncope, weakness, light-headedness, numbness and headaches.   Hematological:  Does not bruise/bleed easily.   Psychiatric/Behavioral:  Negative for sleep disturbance. The patient is not nervous/anxious.        Objective:   /78 (BP Location: Left arm, Patient Position: Sitting, BP Method: Large (Manual))   Pulse 80   Temp 96.5 °F (35.8 °C) (Tympanic)   Ht 5' 10" (1.778 m)   Wt 116.3 kg (256 lb 6.3 oz)   SpO2 98%   BMI 36.79 kg/m²     Physical Exam  Vitals and nursing note reviewed.   Constitutional:       Appearance: Normal appearance. He is well-developed. He is obese.   Eyes:      General: No scleral icterus.        Right eye: Hordeolum present. No foreign body or discharge.      Conjunctiva/sclera: Conjunctivae normal.   Neck:      Vascular: Normal carotid pulses. No carotid bruit or JVD.   Cardiovascular:      Rate and Rhythm: Normal rate and regular rhythm.      Heart sounds: Normal heart sounds.   Pulmonary:      Effort: Pulmonary effort is normal.      Breath sounds: Normal breath sounds.   Abdominal:      General: There is no distension.      Palpations: Abdomen is soft.      Tenderness: There is no abdominal tenderness.   Musculoskeletal:      Right lower leg: No edema.      Left lower leg: No edema.   Skin:     Findings: No rash. "       Assessment:       ICD-10-CM ICD-9-CM   1. Annual physical exam  Z00.00 V70.0   2. Screening for prostate cancer  Z12.5 V76.44   3. Type 2 diabetes mellitus without complication, without long-term current use of insulin  E11.9 250.00   4. Severe obesity (BMI 35.0-39.9) with comorbidity  E66.01 278.01   5. Hyperlipidemia associated with type 2 diabetes mellitus  E11.69 250.80    E78.5 272.4   6. Idiopathic chronic gout of multiple sites without tophus  M1A.09X0 274.02   7. Low testosterone  R79.89 790.99   8. Erectile dysfunction, unspecified erectile dysfunction type  N52.9 607.84   9. Recurrent cold sores  B00.1 054.9     Plan:   Annual physical exam  Screening for prostate cancer  -     CBC Without Differential; Future; Expected date: 05/06/2024  -     Comprehensive Metabolic Panel; Future; Expected date: 05/06/2024  -     Lipid Panel; Future; Expected date: 05/06/2024  -     TSH; Future; Expected date: 05/06/2024  -     Hemoglobin A1C; Future; Expected date: 05/06/2024  -     PSA, Screening; Future; Expected date: 05/06/2024  -     TESTOSTERONE; Future; Expected date: 05/06/2024  -     Uric Acid; Future; Expected date: 05/06/2024  Blood pressure normal.  BMI 37.    Check labs.  Treat as indicated.    Colon cancer screening up-to-date  Prostate cancer screening today   Declines all recommended vaccines    Type 2 diabetes mellitus without complication, without long-term current use of insulin  Severe obesity (BMI 35.0-39.9) with comorbidity  -     Hemoglobin A1C; Future; Expected date: 05/06/2024  -     semaglutide (OZEMPIC) 1 mg/dose (4 mg/3 mL); Inject 1 mg into the skin every 7 days.  Dispense: 9 mL; Refill: 3  Asymptomatic   A1c today   Continue metformin  mg twice a day  Adjust Ozempic 1 mg weekly injection as needed   Eye exam up-to-date  Microalbumin up-to-date  Foot exam up-to-date     Hyperlipidemia associated with type 2 diabetes mellitus  -     Lipid Panel; Future; Expected date: 05/06/2024  -      ezetimibe (ZETIA) 10 mg tablet; Take 1 tablet (10 mg total) by mouth once daily.  Dispense: 90 tablet; Refill: 3  Asymptomatic   Continue Zetia 10 mg daily   Continue Vascepa 2 g twice a day  Adjust Mevacor 40 mg daily as needed     Idiopathic chronic gout of multiple sites without tophus  -     Uric Acid; Future; Expected date: 05/06/2024  No attack within past 18 months   Continue allopurinol 300 mg daily     Low testosterone  -     TESTOSTERONE; Future; Expected date: 05/06/2024  -     Ambulatory referral/consult to Urology; Future; Expected date: 05/13/2024  Referral to Urology to discuss supplementation with testosterone pellets     Erectile dysfunction, unspecified erectile dysfunction type  No contraindications to continuing Cialis as needed     Recurrent cold sores  -     valACYclovir (VALTREX) 1000 MG tablet; Take 1 tablet (1,000 mg total) by mouth once daily.  Dispense: 90 tablet; Refill: 3    Return to clinic 6 months or sooner as needed

## 2024-05-13 ENCOUNTER — LAB VISIT (OUTPATIENT)
Dept: LAB | Facility: HOSPITAL | Age: 54
End: 2024-05-13
Attending: UROLOGY
Payer: COMMERCIAL

## 2024-05-13 ENCOUNTER — OFFICE VISIT (OUTPATIENT)
Dept: UROLOGY | Facility: CLINIC | Age: 54
End: 2024-05-13
Payer: COMMERCIAL

## 2024-05-13 VITALS
WEIGHT: 256.38 LBS | DIASTOLIC BLOOD PRESSURE: 77 MMHG | BODY MASS INDEX: 36.7 KG/M2 | SYSTOLIC BLOOD PRESSURE: 122 MMHG | HEIGHT: 70 IN | HEART RATE: 84 BPM

## 2024-05-13 DIAGNOSIS — N52.03 COMBINED ARTERIAL INSUFFICIENCY AND CORPORO-VENOUS OCCLUSIVE ERECTILE DYSFUNCTION: ICD-10-CM

## 2024-05-13 DIAGNOSIS — E29.1 HYPOGONADISM MALE: ICD-10-CM

## 2024-05-13 DIAGNOSIS — E29.1 HYPOGONADISM MALE: Primary | ICD-10-CM

## 2024-05-13 LAB
LH SERPL-ACNC: 1.9 MIU/ML (ref 0.6–12.1)
PROLACTIN SERPL IA-MCNC: 3.3 NG/ML (ref 3.5–19.4)

## 2024-05-13 PROCEDURE — 82040 ASSAY OF SERUM ALBUMIN: CPT | Performed by: UROLOGY

## 2024-05-13 PROCEDURE — 84146 ASSAY OF PROLACTIN: CPT | Performed by: UROLOGY

## 2024-05-13 PROCEDURE — 99204 OFFICE O/P NEW MOD 45 MIN: CPT | Mod: S$GLB,,, | Performed by: UROLOGY

## 2024-05-13 PROCEDURE — 36415 COLL VENOUS BLD VENIPUNCTURE: CPT | Performed by: UROLOGY

## 2024-05-13 PROCEDURE — 84270 ASSAY OF SEX HORMONE GLOBUL: CPT | Performed by: UROLOGY

## 2024-05-13 PROCEDURE — 99999 PR PBB SHADOW E&M-EST. PATIENT-LVL III: CPT | Mod: PBBFAC,,, | Performed by: UROLOGY

## 2024-05-13 PROCEDURE — 83002 ASSAY OF GONADOTROPIN (LH): CPT | Performed by: UROLOGY

## 2024-05-13 RX ORDER — TADALAFIL 5 MG/1
5 TABLET ORAL DAILY
Qty: 30 TABLET | Refills: 11 | Status: SHIPPED | OUTPATIENT
Start: 2024-05-13

## 2024-05-13 NOTE — PROGRESS NOTES
Chief Complaint:   Encounter Diagnoses   Name Primary?    Hypogonadism male Yes    Combined arterial insufficiency and corporo-venous occlusive erectile dysfunction        HPI:  53-year-old gentleman who was previously getting proximally 100-120 mg of testosterone weekly through an outside clinic.  Patient has been off it for now month, symptoms have returned such as decreased libido and energy, in addition a brain fog.  Patient is interested in other options instead of injections.  Patient is having no lower urinary tract symptoms, no other urological history except for 1 stone passage years ago.  No family history of urological cancers, his father has had stones.  Patient has been taking proximally 5 mg of Cialis daily which did assist, he is breaking up a 20 mg tablet.      Allergies:  Patient has no known allergies.    Medications:  See MAR    Review of Systems:  General: No fever, chills, fatigability, or weight loss.  Skin: No rashes, itching, or changes in color or texture of skin.  Chest: Denies NATHAN, cyanosis, wheezing, cough, and sputum production.  Abdomen: Appetite fine. No weight loss. Denies diarrhea, abdominal pain, hematemesis, or blood in stool.  Musculoskeletal: No joint stiffness or swelling. Denies back pain.  : As above.  All other review of systems negative.    PMH:   has a past medical history of Diabetes mellitus, type 2, Hyperlipidemia associated with type 2 diabetes mellitus (12/9/2020), Idiopathic chronic gout of multiple sites without tophus (12/9/2020), Recurrent cold sores (12/9/2020), and Severe obesity (BMI 35.0-39.9) with comorbidity (12/9/2020).    PSH:   has a past surgical history that includes Appendectomy and Knee surgery.    FamHx: family history includes Heart disease in his father.    SocHx:  reports that he has never smoked. He has never used smokeless tobacco. He reports current alcohol use. He reports that he does not use drugs.      Physical Exam:  Vitals:    05/13/24  1527   BP: 122/77   Pulse: 84     General: A&Ox3, no apparent distress, no deformities  Neck: No masses, normal ROM  Lungs: normal inspiration, no use of accessory muscles  Heart: normal pulse, no arrhythmias  Abdomen: Soft, NT, ND, no masses, no hernias, no hepatosplenomegaly  Skin: The skin is warm and dry. No jaundice.  Ext: No c/c/e.  : 5/24- Test desc mark, no abnormalities of epididymus. Normal penile and scrotal skin. Meatus normal.    Labs/Studies:   PSA 0.64 5/24   Testosterone 85 5/24    Impression/Plan:     1. Hypogonadism- previous usage of approximately 100 mg weekly did assist, patient does not want to pursue shots or creams though.  Will go ahead and initiate testopel treatment.  Full panel of labs today, call with any issues prior to the next appointment.    2. Erectile dysfunction- go ahead and initiate Cialis 5 mg daily, for ease of usage.

## 2024-05-24 LAB
ALBUMIN SERPL-MCNC: 4.6 G/DL (ref 3.6–5.1)
SHBG SERPL-SCNC: 8 NMOL/L (ref 10–50)
TESTOST FREE SERPL-MCNC: 38.1 PG/ML (ref 46–224)
TESTOST SERPL-MCNC: 134 NG/DL (ref 250–1100)
TESTOSTERONE.FREE+WB SERPL-MCNC: 80.1 NG/DL

## 2024-06-10 ENCOUNTER — PROCEDURE VISIT (OUTPATIENT)
Dept: UROLOGY | Facility: CLINIC | Age: 54
End: 2024-06-10
Payer: COMMERCIAL

## 2024-06-10 VITALS
RESPIRATION RATE: 18 BRPM | SYSTOLIC BLOOD PRESSURE: 137 MMHG | WEIGHT: 255.94 LBS | BODY MASS INDEX: 36.64 KG/M2 | HEIGHT: 70 IN | DIASTOLIC BLOOD PRESSURE: 89 MMHG | HEART RATE: 79 BPM

## 2024-06-10 DIAGNOSIS — E29.1 HYPOGONADISM MALE: Primary | ICD-10-CM

## 2024-06-10 DIAGNOSIS — N52.03 COMBINED ARTERIAL INSUFFICIENCY AND CORPORO-VENOUS OCCLUSIVE ERECTILE DYSFUNCTION: ICD-10-CM

## 2024-06-10 RX ORDER — LEVOFLOXACIN 500 MG/1
500 TABLET, FILM COATED ORAL DAILY
Qty: 3 TABLET | Refills: 0 | Status: SHIPPED | OUTPATIENT
Start: 2024-06-10 | End: 2024-06-10

## 2024-06-10 RX ORDER — LEVOFLOXACIN 500 MG/1
500 TABLET, FILM COATED ORAL DAILY
Qty: 3 TABLET | Refills: 0 | Status: SHIPPED | OUTPATIENT
Start: 2024-06-10 | End: 2024-06-15

## 2024-06-10 NOTE — PROCEDURES
Procedures  Chief Complaint:   Encounter Diagnoses   Name Primary?    Hypogonadism male Yes    Combined arterial insufficiency and corporo-venous occlusive erectile dysfunction        HPI:    6/10/24- here for his first testopel.    53-year-old gentleman who was previously getting approximately 100-120 mg of testosterone weekly through an outside clinic.  Patient has been off it for now month, symptoms have returned such as decreased libido and energy, in addition a brain fog.  Patient is interested in other options instead of injections.  Patient is having no lower urinary tract symptoms, no other urological history except for 1 stone passage years ago.  No family history of urological cancers, his father has had stones.  Patient has been taking proximally 5 mg of Cialis daily which did assist, he is breaking up a 20 mg tablet.      Allergies:  Patient has no known allergies.    Medications:  See MAR    Review of Systems:  General: No fever, chills, fatigability, or weight loss.  Skin: No rashes, itching, or changes in color or texture of skin.  Chest: Denies NATHAN, cyanosis, wheezing, cough, and sputum production.  Abdomen: Appetite fine. No weight loss. Denies diarrhea, abdominal pain, hematemesis, or blood in stool.  Musculoskeletal: No joint stiffness or swelling. Denies back pain.  : As above.  All other review of systems negative.    PMH:   has a past medical history of Diabetes mellitus, type 2, Hyperlipidemia associated with type 2 diabetes mellitus (12/9/2020), Idiopathic chronic gout of multiple sites without tophus (12/9/2020), Recurrent cold sores (12/9/2020), and Severe obesity (BMI 35.0-39.9) with comorbidity (12/9/2020).    PSH:   has a past surgical history that includes Appendectomy and Knee surgery.    FamHx: family history includes Heart disease in his father.    SocHx:  reports that he has never smoked. He has never used smokeless tobacco. He reports current alcohol use. He reports that he  does not use drugs.      Physical Exam:  There were no vitals filed for this visit.    General: A&Ox3, no apparent distress, no deformities  Neck: No masses, normal ROM  Lungs: normal inspiration, no use of accessory muscles  Heart: normal pulse, no arrhythmias  Abdomen: Soft, NT, ND, no masses, no hernias, no hepatosplenomegaly  Skin: The skin is warm and dry. No jaundice.  Ext: No c/c/e.  : 5/24- Test desc mark, no abnormalities of epididymus. Normal penile and scrotal skin. Meatus normal.    Labs/Studies:   Testopel  6/10/24  PSA 0.64 5/24   Testosterone 85 5/24    Patient was sterilely prepped and draped, then positioned in the right side up, lateral decubitus position.  Lidocaine was used for local anesthesia.  Eleven blade was used to incise the skin, included trocars with the testopel kit were then used.  They were inserted within the incision site and we placed the pellets in a V location.  6 pellets total were inserted without difficulty.  Trocars were removed and pressure was applied for 2 min.  Steri-Strips applied for local coverage, he will return for lab assessment in 3 months.      Impression/Plan:     1. Hypogonadism- patient tolerated testopel insertion today, call with any issues prior to the next visit.  Otherwise will see him in 3 months with labs and to determine his interval.  Of note previous usage of approximately 100 mg weekly did assist.    2. Erectile dysfunction- go ahead and initiate Cialis 5 mg daily, for ease of usage.

## 2024-09-09 ENCOUNTER — LAB VISIT (OUTPATIENT)
Dept: LAB | Facility: HOSPITAL | Age: 54
End: 2024-09-09
Attending: UROLOGY
Payer: COMMERCIAL

## 2024-09-09 DIAGNOSIS — E29.1 HYPOGONADISM MALE: ICD-10-CM

## 2024-09-09 LAB
ALBUMIN SERPL BCP-MCNC: 4.2 G/DL (ref 3.5–5.2)
ALP SERPL-CCNC: 47 U/L (ref 55–135)
ALT SERPL W/O P-5'-P-CCNC: 25 U/L (ref 10–44)
AST SERPL-CCNC: 22 U/L (ref 10–40)
BASOPHILS # BLD AUTO: 0.06 K/UL (ref 0–0.2)
BASOPHILS NFR BLD: 0.6 % (ref 0–1.9)
BILIRUB DIRECT SERPL-MCNC: 0.2 MG/DL (ref 0.1–0.3)
BILIRUB SERPL-MCNC: 0.7 MG/DL (ref 0.1–1)
DIFFERENTIAL METHOD BLD: ABNORMAL
EOSINOPHIL # BLD AUTO: 0.2 K/UL (ref 0–0.5)
EOSINOPHIL NFR BLD: 1.6 % (ref 0–8)
ERYTHROCYTE [DISTWIDTH] IN BLOOD BY AUTOMATED COUNT: 12 % (ref 11.5–14.5)
HCT VFR BLD AUTO: 45.2 % (ref 40–54)
HGB BLD-MCNC: 15.8 G/DL (ref 14–18)
IMM GRANULOCYTES # BLD AUTO: 0.07 K/UL (ref 0–0.04)
IMM GRANULOCYTES NFR BLD AUTO: 0.6 % (ref 0–0.5)
LYMPHOCYTES # BLD AUTO: 4.1 K/UL (ref 1–4.8)
LYMPHOCYTES NFR BLD: 38.4 % (ref 18–48)
MCH RBC QN AUTO: 31.2 PG (ref 27–31)
MCHC RBC AUTO-ENTMCNC: 35 G/DL (ref 32–36)
MCV RBC AUTO: 89 FL (ref 82–98)
MONOCYTES # BLD AUTO: 0.6 K/UL (ref 0.3–1)
MONOCYTES NFR BLD: 5.3 % (ref 4–15)
NEUTROPHILS # BLD AUTO: 5.8 K/UL (ref 1.8–7.7)
NEUTROPHILS NFR BLD: 53.5 % (ref 38–73)
NRBC BLD-RTO: 0 /100 WBC
PLATELET # BLD AUTO: 215 K/UL (ref 150–450)
PMV BLD AUTO: 10.2 FL (ref 9.2–12.9)
PROT SERPL-MCNC: 7.3 G/DL (ref 6–8.4)
RBC # BLD AUTO: 5.06 M/UL (ref 4.6–6.2)
TESTOST SERPL-MCNC: 215 NG/DL (ref 304–1227)
WBC # BLD AUTO: 10.79 K/UL (ref 3.9–12.7)

## 2024-09-09 PROCEDURE — 84403 ASSAY OF TOTAL TESTOSTERONE: CPT | Performed by: UROLOGY

## 2024-09-09 PROCEDURE — 36415 COLL VENOUS BLD VENIPUNCTURE: CPT | Performed by: UROLOGY

## 2024-09-09 PROCEDURE — 85025 COMPLETE CBC W/AUTO DIFF WBC: CPT | Performed by: UROLOGY

## 2024-09-09 PROCEDURE — 80076 HEPATIC FUNCTION PANEL: CPT | Performed by: UROLOGY

## 2024-11-10 DIAGNOSIS — M1A.09X0 IDIOPATHIC CHRONIC GOUT OF MULTIPLE SITES WITHOUT TOPHUS: ICD-10-CM

## 2024-11-10 RX ORDER — ALLOPURINOL 300 MG/1
300 TABLET ORAL
Qty: 90 TABLET | Refills: 1 | Status: SHIPPED | OUTPATIENT
Start: 2024-11-10

## 2024-11-10 NOTE — TELEPHONE ENCOUNTER
Refill Authorization Note     Refill Decision Note   Andrade Barahona  is requesting a refill authorization.  Brief Assessment and Rationale for Refill:  Approve     Medication Therapy Plan:       Medication Reconciliation Completed: No   Comments:     No Care Gaps recommended.     Note composed:9:56 AM 11/10/2024

## 2024-11-10 NOTE — TELEPHONE ENCOUNTER
No care due was identified.  [facility] Embedded Care Due Messages. Reference number: [id number].   11/10/2024 7:27:06 AM CST

## 2024-11-13 DIAGNOSIS — E11.9 TYPE 2 DIABETES MELLITUS WITHOUT COMPLICATION: ICD-10-CM

## 2024-11-18 ENCOUNTER — OFFICE VISIT (OUTPATIENT)
Dept: UROLOGY | Facility: CLINIC | Age: 54
End: 2024-11-18
Payer: COMMERCIAL

## 2024-11-18 VITALS — DIASTOLIC BLOOD PRESSURE: 78 MMHG | SYSTOLIC BLOOD PRESSURE: 121 MMHG | HEART RATE: 97 BPM

## 2024-11-18 DIAGNOSIS — E29.1 HYPOGONADISM MALE: Primary | ICD-10-CM

## 2024-11-18 DIAGNOSIS — N52.03 COMBINED ARTERIAL INSUFFICIENCY AND CORPORO-VENOUS OCCLUSIVE ERECTILE DYSFUNCTION: ICD-10-CM

## 2024-11-18 PROCEDURE — 99214 OFFICE O/P EST MOD 30 MIN: CPT | Mod: 25,S$GLB,, | Performed by: UROLOGY

## 2024-11-18 PROCEDURE — 99999 PR PBB SHADOW E&M-EST. PATIENT-LVL III: CPT | Mod: PBBFAC,,, | Performed by: UROLOGY

## 2024-11-18 PROCEDURE — 96372 THER/PROPH/DIAG INJ SC/IM: CPT | Mod: S$GLB,,, | Performed by: UROLOGY

## 2024-11-18 RX ORDER — TESTOSTERONE CYPIONATE 200 MG/ML
200 INJECTION, SOLUTION INTRAMUSCULAR ONCE
Status: COMPLETED | OUTPATIENT
Start: 2024-11-18 | End: 2024-11-18

## 2024-11-18 RX ORDER — TESTOSTERONE CYPIONATE 200 MG/ML
200 INJECTION, SOLUTION INTRAMUSCULAR
Qty: 10 ML | Refills: 5 | Status: SHIPPED | OUTPATIENT
Start: 2024-11-18 | End: 2027-03-08

## 2024-11-18 RX ADMIN — TESTOSTERONE CYPIONATE 200 MG: 200 INJECTION, SOLUTION INTRAMUSCULAR at 03:11

## 2024-11-18 NOTE — PROGRESS NOTES
Chief Complaint:   Encounter Diagnoses   Name Primary?    Hypogonadism male Yes    Combined arterial insufficiency and corporo-venous occlusive erectile dysfunction        HPI:    11/18/24- testosterone pellets worked well, due to the fact that he now lives in [place] he would like to do injectable therapy.  It also assisted his erectile dysfunction.    53-year-old gentleman who was previously getting approximately 100-120 mg of testosterone weekly through an outside clinic.  Patient has been off it for now month, symptoms have returned such as decreased libido and energy, in addition a brain fog.  Patient is interested in other options instead of injections.  Patient is having no lower urinary tract symptoms, no other urological history except for 1 stone passage years ago.  No family history of urological cancers, his father has had stones.  Patient has been taking proximally 5 mg of Cialis daily which did assist, he is breaking up a 20 mg tablet.      Allergies:  Patient has no known allergies.    Medications:  See MAR    Review of Systems:  General: No fever, chills, fatigability, or weight loss.  Skin: No rashes, itching, or changes in color or texture of skin.  Chest: Denies NATHAN, cyanosis, wheezing, cough, and sputum production.  Abdomen: Appetite fine. No weight loss. Denies diarrhea, abdominal pain, hematemesis, or blood in stool.  Musculoskeletal: No joint stiffness or swelling. Denies back pain.  : As above.  All other review of systems negative.    PMH:   has a past medical history of Diabetes mellitus, type 2, Hyperlipidemia associated with type 2 diabetes mellitus (12/9/2020), Idiopathic chronic gout of multiple sites without tophus (12/9/2020), Recurrent cold sores (12/9/2020), and Severe obesity (BMI 35.0-39.9) with comorbidity (12/9/2020).    PSH:   has a past surgical history that includes Appendectomy and Knee surgery.    FamHx: family history includes Heart disease in his father.    SocHx:   reports that he has never smoked. He has never used smokeless tobacco. He reports current alcohol use. He reports that he does not use drugs.      Physical Exam:  There were no vitals filed for this visit.    General: A&Ox3, no apparent distress, no deformities  Neck: No masses, normal ROM  Lungs: normal inspiration, no use of accessory muscles  Heart: normal pulse, no arrhythmias  Abdomen: Soft, NT, ND, no masses, no hernias, no hepatosplenomegaly  Skin: The skin is warm and dry. No jaundice.  Ext: No c/c/e.  : 5/24- Test desc mark, no abnormalities of epididymus. Normal penile and scrotal skin. Meatus normal.    Labs/Studies:   Testopel  6/10/24  PSA 0.64 5/24   Testosterone 85 5/24    Impression/Plan:       1. Hypogonadism- patient did well with the testopel.  Unfortunately due to the fact that he now lives in [place], this has become difficult from a logistical standpoint.  Therefore initiate 200 mg every 14 days, see me back in 3 months with labs and proceed as appropriate.  Once stable may pursue every 6 months with labs.  Call with any other issues prior to the next appointment.    2. Erectile dysfunction- Cialis 5 mg daily works well, especially when controlled on testosterone.

## 2024-11-18 NOTE — PROGRESS NOTES
..Patient came in for scheduled Depo-testosterone 200 mg injection as ordered per Dr. Torres , Depo-testosterone 200 mg injection administered IM to LEFT dorsalgluteal with 23G needle using aseptic technique. Pt tolerated procedure well. Patient agreed to wait 15 minutes after injection in the clinic and to report any adverse reactions.    .Pt came in for depo-testosterone injection teaching. Explained to pt the importance of using aseptic technique during this injection process. Demonstrated to the pt how to draw medication up, choose a location, and self administer medication. Pt and spouse was able to verbalize understanding of how to draw up medication and self administer medication. Administered 200 mg of depo testosterone to L dorsalgluteal site without difficulty. Pt denies any questions or concerns at the moment. Advised pt to remain in clinic for 15 minutes and report any adverse reaction to staff.      Nba Perez RN